# Patient Record
Sex: MALE | Race: WHITE | Employment: OTHER | URBAN - METROPOLITAN AREA
[De-identification: names, ages, dates, MRNs, and addresses within clinical notes are randomized per-mention and may not be internally consistent; named-entity substitution may affect disease eponyms.]

---

## 2018-05-27 PROBLEM — R91.1 PULMONARY NODULE: Status: ACTIVE | Noted: 2018-05-27

## 2018-05-27 PROBLEM — K56.609 SBO (SMALL BOWEL OBSTRUCTION) (HCC): Status: ACTIVE | Noted: 2018-05-27

## 2018-05-27 PROBLEM — E86.0 DEHYDRATION: Status: ACTIVE | Noted: 2018-05-27

## 2018-06-20 ENCOUNTER — HOSPITAL ENCOUNTER (OUTPATIENT)
Dept: OTHER | Age: 58
Discharge: HOME | End: 2018-06-20
Attending: INTERNAL MEDICINE | Admitting: INTERNAL MEDICINE

## 2018-06-20 VITALS
SYSTOLIC BLOOD PRESSURE: 103 MMHG | HEART RATE: 64 BPM | RESPIRATION RATE: 16 BRPM | OXYGEN SATURATION: 95 % | DIASTOLIC BLOOD PRESSURE: 75 MMHG | TEMPERATURE: 97 F

## 2018-06-20 RX ORDER — HYDRALAZINE HYDROCHLORIDE 20 MG/ML
5 INJECTION INTRAMUSCULAR; INTRAVENOUS EVERY 10 MIN PRN
Status: DISCONTINUED | OUTPATIENT
Start: 2018-06-20 | End: 2018-06-21 | Stop reason: HOSPADM

## 2018-06-20 RX ORDER — SODIUM CHLORIDE, SODIUM LACTATE, POTASSIUM CHLORIDE, CALCIUM CHLORIDE 600; 310; 30; 20 MG/100ML; MG/100ML; MG/100ML; MG/100ML
INJECTION, SOLUTION INTRAVENOUS CONTINUOUS
Status: DISCONTINUED | OUTPATIENT
Start: 2018-06-20 | End: 2018-06-21 | Stop reason: HOSPADM

## 2018-06-20 RX ORDER — OXYCODONE HYDROCHLORIDE AND ACETAMINOPHEN 5; 325 MG/1; MG/1
2 TABLET ORAL PRN
Status: ACTIVE | OUTPATIENT
Start: 2018-06-20 | End: 2018-06-20

## 2018-06-20 RX ORDER — OXYCODONE HYDROCHLORIDE AND ACETAMINOPHEN 5; 325 MG/1; MG/1
1 TABLET ORAL PRN
Status: ACTIVE | OUTPATIENT
Start: 2018-06-20 | End: 2018-06-20

## 2018-06-20 RX ORDER — MORPHINE SULFATE 10 MG/ML
2 INJECTION, SOLUTION INTRAMUSCULAR; INTRAVENOUS EVERY 5 MIN PRN
Status: DISCONTINUED | OUTPATIENT
Start: 2018-06-20 | End: 2018-06-21 | Stop reason: HOSPADM

## 2018-06-20 RX ORDER — ONDANSETRON 2 MG/ML
4 INJECTION INTRAMUSCULAR; INTRAVENOUS
Status: ACTIVE | OUTPATIENT
Start: 2018-06-20 | End: 2018-06-20

## 2018-06-20 RX ORDER — MORPHINE SULFATE 10 MG/ML
1 INJECTION, SOLUTION INTRAMUSCULAR; INTRAVENOUS EVERY 5 MIN PRN
Status: DISCONTINUED | OUTPATIENT
Start: 2018-06-20 | End: 2018-06-21 | Stop reason: HOSPADM

## 2018-06-20 RX ORDER — DIPHENHYDRAMINE HYDROCHLORIDE 50 MG/ML
12.5 INJECTION INTRAMUSCULAR; INTRAVENOUS
Status: ACTIVE | OUTPATIENT
Start: 2018-06-20 | End: 2018-06-20

## 2018-06-20 RX ORDER — MEPERIDINE HYDROCHLORIDE 25 MG/ML
12.5 INJECTION INTRAMUSCULAR; INTRAVENOUS; SUBCUTANEOUS EVERY 5 MIN PRN
Status: DISCONTINUED | OUTPATIENT
Start: 2018-06-20 | End: 2018-06-21 | Stop reason: HOSPADM

## 2018-06-20 RX ORDER — PROMETHAZINE HYDROCHLORIDE 25 MG/ML
6.25 INJECTION, SOLUTION INTRAMUSCULAR; INTRAVENOUS
Status: ACTIVE | OUTPATIENT
Start: 2018-06-20 | End: 2018-06-20

## 2018-06-20 RX ORDER — LABETALOL HYDROCHLORIDE 5 MG/ML
5 INJECTION, SOLUTION INTRAVENOUS EVERY 10 MIN PRN
Status: DISCONTINUED | OUTPATIENT
Start: 2018-06-20 | End: 2018-06-21 | Stop reason: HOSPADM

## 2018-06-20 RX ADMIN — SODIUM CHLORIDE, SODIUM LACTATE, POTASSIUM CHLORIDE, CALCIUM CHLORIDE: 600; 310; 30; 20 INJECTION, SOLUTION INTRAVENOUS at 08:43

## 2018-06-20 ASSESSMENT — PAIN DESCRIPTION - ORIENTATION: ORIENTATION: LOWER

## 2018-06-20 ASSESSMENT — PAIN SCALES - GENERAL
PAINLEVEL_OUTOF10: 2
PAINLEVEL_OUTOF10: 1

## 2018-06-20 ASSESSMENT — PAIN - FUNCTIONAL ASSESSMENT: PAIN_FUNCTIONAL_ASSESSMENT: 0-10

## 2018-06-20 ASSESSMENT — PAIN DESCRIPTION - DESCRIPTORS: DESCRIPTORS: CRAMPING

## 2018-06-20 ASSESSMENT — PAIN DESCRIPTION - PAIN TYPE: TYPE: SURGICAL PAIN

## 2018-06-20 ASSESSMENT — PAIN DESCRIPTION - LOCATION: LOCATION: ABDOMEN

## 2018-06-23 ENCOUNTER — HOSPITAL ENCOUNTER (OUTPATIENT)
Dept: CT IMAGING | Age: 58
Discharge: HOME | End: 2018-06-23
Attending: FAMILY MEDICINE | Admitting: FAMILY MEDICINE

## 2018-06-23 DIAGNOSIS — R91.1 LUNG NODULE SEEN ON IMAGING STUDY: ICD-10-CM

## 2018-06-26 PROBLEM — E86.0 DEHYDRATION: Status: RESOLVED | Noted: 2018-05-27 | Resolved: 2018-06-26

## 2018-06-29 PROBLEM — K56.609 SMALL BOWEL OBSTRUCTION (HCC): Status: RESOLVED | Noted: 2018-05-27 | Resolved: 2018-06-29

## 2018-06-29 PROBLEM — K56.609 SBO (SMALL BOWEL OBSTRUCTION) (HCC): Status: ACTIVE | Noted: 2018-06-29

## 2018-07-20 ENCOUNTER — HOSPITAL ENCOUNTER (OUTPATIENT)
Dept: GENERAL RADIOLOGY | Age: 58
Discharge: HOME | End: 2018-07-20
Payer: COMMERCIAL

## 2018-07-20 DIAGNOSIS — R10.9 ABDOMINAL PAIN, UNSPECIFIED ABDOMINAL LOCATION: ICD-10-CM

## 2018-07-20 DIAGNOSIS — K56.609 SBO (SMALL BOWEL OBSTRUCTION) (HCC): ICD-10-CM

## 2018-07-20 DIAGNOSIS — K59.00 CONSTIPATION, UNSPECIFIED CONSTIPATION TYPE: ICD-10-CM

## 2018-07-20 PROCEDURE — 74250 X-RAY XM SM INT 1CNTRST STD: CPT

## 2018-07-31 ENCOUNTER — HOSPITAL ENCOUNTER (OUTPATIENT)
Age: 58
Discharge: HOME | End: 2018-07-31
Payer: COMMERCIAL

## 2018-07-31 ENCOUNTER — HOSPITAL ENCOUNTER (OUTPATIENT)
Dept: GENERAL RADIOLOGY | Age: 58
Discharge: HOME | End: 2018-07-31
Payer: COMMERCIAL

## 2018-07-31 DIAGNOSIS — K56.699 SMALL BOWEL STRICTURE (HCC): ICD-10-CM

## 2018-07-31 PROCEDURE — 74018 RADEX ABDOMEN 1 VIEW: CPT

## 2018-08-01 ENCOUNTER — HOSPITAL ENCOUNTER (OUTPATIENT)
Age: 58
Discharge: HOME | End: 2018-08-01
Payer: COMMERCIAL

## 2018-08-01 ENCOUNTER — HOSPITAL ENCOUNTER (OUTPATIENT)
Dept: GENERAL RADIOLOGY | Age: 58
Discharge: HOME | End: 2018-08-01
Payer: COMMERCIAL

## 2018-08-01 DIAGNOSIS — K56.699 SMALL BOWEL STRICTURE (HCC): ICD-10-CM

## 2018-08-01 PROCEDURE — 74018 RADEX ABDOMEN 1 VIEW: CPT

## 2018-08-03 ENCOUNTER — HOSPITAL ENCOUNTER (OUTPATIENT)
Age: 58
Discharge: HOME | End: 2018-08-03
Payer: COMMERCIAL

## 2018-08-03 ENCOUNTER — HOSPITAL ENCOUNTER (OUTPATIENT)
Dept: GENERAL RADIOLOGY | Age: 58
Discharge: HOME | End: 2018-08-03
Payer: COMMERCIAL

## 2018-08-03 DIAGNOSIS — K56.699 STENOSIS OF INTESTINE (HCC): ICD-10-CM

## 2018-08-03 PROCEDURE — 74018 RADEX ABDOMEN 1 VIEW: CPT

## 2018-08-10 ENCOUNTER — HOSPITAL ENCOUNTER (OUTPATIENT)
Age: 58
Discharge: HOME | End: 2018-08-10
Payer: COMMERCIAL

## 2018-08-15 ENCOUNTER — HOSPITAL ENCOUNTER (OUTPATIENT)
Dept: CT IMAGING | Age: 58
Discharge: HOME | End: 2018-08-15
Payer: COMMERCIAL

## 2018-08-15 DIAGNOSIS — K50.90 CROHN'S DISEASE WITHOUT COMPLICATION, UNSPECIFIED GASTROINTESTINAL TRACT LOCATION (HCC): ICD-10-CM

## 2018-08-15 DIAGNOSIS — K56.699 SMALL BOWEL STRICTURE (HCC): ICD-10-CM

## 2018-08-15 PROCEDURE — 6360000004 HC RX CONTRAST MEDICATION: Performed by: INTERNAL MEDICINE

## 2018-08-15 PROCEDURE — 74177 CT ABD & PELVIS W/CONTRAST: CPT

## 2018-08-15 PROCEDURE — 2500000003 HC RX 250 WO HCPCS: Performed by: INTERNAL MEDICINE

## 2018-08-15 RX ADMIN — BARIUM SULFATE 450 ML: 1 SUSPENSION ORAL at 06:00

## 2018-08-15 RX ADMIN — IOPAMIDOL 75 ML: 755 INJECTION, SOLUTION INTRAVENOUS at 07:20

## 2018-10-11 ENCOUNTER — HOSPITAL ENCOUNTER (OUTPATIENT)
Age: 58
Discharge: HOME | End: 2018-10-11
Payer: COMMERCIAL

## 2018-10-11 LAB
A/G RATIO: 1.6 (ref 1.1–2.2)
ALBUMIN SERPL-MCNC: 4.2 G/DL (ref 3.4–5)
ALP BLD-CCNC: 91 U/L (ref 40–129)
ALT SERPL-CCNC: 12 U/L (ref 10–40)
ANION GAP SERPL CALCULATED.3IONS-SCNC: 11 MMOL/L (ref 3–16)
AST SERPL-CCNC: 14 U/L (ref 15–37)
BASOPHILS ABSOLUTE: 0 K/UL (ref 0–0.2)
BASOPHILS RELATIVE PERCENT: 0.4 %
BILIRUB SERPL-MCNC: 0.6 MG/DL (ref 0–1)
BUN BLDV-MCNC: 16 MG/DL (ref 7–20)
CALCIUM SERPL-MCNC: 9.4 MG/DL (ref 8.3–10.6)
CHLORIDE BLD-SCNC: 102 MMOL/L (ref 99–110)
CO2: 30 MMOL/L (ref 21–32)
CREAT SERPL-MCNC: 1 MG/DL (ref 0.9–1.3)
EOSINOPHILS ABSOLUTE: 0.2 K/UL (ref 0–0.6)
EOSINOPHILS RELATIVE PERCENT: 3.1 %
GFR AFRICAN AMERICAN: >60
GFR NON-AFRICAN AMERICAN: >60
GLOBULIN: 2.7 G/DL
GLUCOSE BLD-MCNC: 97 MG/DL (ref 70–99)
HCT VFR BLD CALC: 44.8 % (ref 40.5–52.5)
HEMOGLOBIN: 14.7 G/DL (ref 13.5–17.5)
LYMPHOCYTES ABSOLUTE: 1.1 K/UL (ref 1–5.1)
LYMPHOCYTES RELATIVE PERCENT: 21 %
MCH RBC QN AUTO: 28.7 PG (ref 26–34)
MCHC RBC AUTO-ENTMCNC: 32.8 G/DL (ref 31–36)
MCV RBC AUTO: 87.6 FL (ref 80–100)
MONOCYTES ABSOLUTE: 0.3 K/UL (ref 0–1.3)
MONOCYTES RELATIVE PERCENT: 5.1 %
NEUTROPHILS ABSOLUTE: 3.7 K/UL (ref 1.7–7.7)
NEUTROPHILS RELATIVE PERCENT: 70.4 %
PDW BLD-RTO: 15 % (ref 12.4–15.4)
PLATELET # BLD: 115 K/UL (ref 135–450)
PMV BLD AUTO: 7.4 FL (ref 5–10.5)
POTASSIUM SERPL-SCNC: 4.5 MMOL/L (ref 3.5–5.1)
RBC # BLD: 5.11 M/UL (ref 4.2–5.9)
SODIUM BLD-SCNC: 143 MMOL/L (ref 136–145)
TOTAL PROTEIN: 6.9 G/DL (ref 6.4–8.2)
WBC # BLD: 5.2 K/UL (ref 4–11)

## 2018-10-11 PROCEDURE — 36415 COLL VENOUS BLD VENIPUNCTURE: CPT

## 2018-10-11 PROCEDURE — 85025 COMPLETE CBC W/AUTO DIFF WBC: CPT

## 2018-10-11 PROCEDURE — 80053 COMPREHEN METABOLIC PANEL: CPT

## 2018-10-29 ENCOUNTER — HOSPITAL ENCOUNTER (OUTPATIENT)
Age: 58
Discharge: HOME | End: 2018-10-29
Payer: COMMERCIAL

## 2018-10-29 ENCOUNTER — HOSPITAL ENCOUNTER (OUTPATIENT)
Dept: GENERAL RADIOLOGY | Age: 58
Discharge: HOME | End: 2018-10-29
Payer: COMMERCIAL

## 2018-10-29 DIAGNOSIS — K59.00 CONSTIPATION, UNSPECIFIED CONSTIPATION TYPE: ICD-10-CM

## 2018-10-29 PROCEDURE — 74019 RADEX ABDOMEN 2 VIEWS: CPT

## 2019-07-12 ENCOUNTER — HOSPITAL ENCOUNTER (OUTPATIENT)
Age: 59
Discharge: HOME | End: 2019-07-12
Payer: COMMERCIAL

## 2019-07-12 ENCOUNTER — HOSPITAL ENCOUNTER (OUTPATIENT)
Dept: MRI IMAGING | Age: 59
Discharge: HOME | End: 2019-07-12
Payer: COMMERCIAL

## 2019-07-12 DIAGNOSIS — G90.2: ICD-10-CM

## 2019-07-12 LAB
CREAT SERPL-MCNC: 0.9 MG/DL (ref 0.8–1.3)
GFR AFRICAN AMERICAN: >60
GFR NON-AFRICAN AMERICAN: >60

## 2019-07-12 PROCEDURE — 70553 MRI BRAIN STEM W/O & W/DYE: CPT

## 2019-07-12 PROCEDURE — A9579 GAD-BASE MR CONTRAST NOS,1ML: HCPCS | Performed by: OPHTHALMOLOGY

## 2019-07-12 PROCEDURE — 6360000004 HC RX CONTRAST MEDICATION: Performed by: OPHTHALMOLOGY

## 2019-07-12 PROCEDURE — 36415 COLL VENOUS BLD VENIPUNCTURE: CPT

## 2019-07-12 PROCEDURE — 82565 ASSAY OF CREATININE: CPT

## 2019-07-12 RX ADMIN — GADOTERIDOL 18 ML: 279.3 INJECTION, SOLUTION INTRAVENOUS at 11:15

## 2021-07-02 ENCOUNTER — APPOINTMENT (OUTPATIENT)
Dept: CT IMAGING | Age: 61
DRG: 386 | End: 2021-07-02

## 2021-07-02 ENCOUNTER — HOSPITAL ENCOUNTER (INPATIENT)
Age: 61
LOS: 2 days | Discharge: HOME | DRG: 386 | End: 2021-07-04
Attending: STUDENT IN AN ORGANIZED HEALTH CARE EDUCATION/TRAINING PROGRAM | Admitting: HOSPITALIST

## 2021-07-02 ENCOUNTER — APPOINTMENT (OUTPATIENT)
Dept: GENERAL RADIOLOGY | Age: 61
DRG: 386 | End: 2021-07-02

## 2021-07-02 DIAGNOSIS — K56.609 SMALL BOWEL OBSTRUCTION (HCC): Primary | ICD-10-CM

## 2021-07-02 LAB
A/G RATIO: 1.7 (ref 1.1–2.2)
ALBUMIN SERPL-MCNC: 4.9 G/DL (ref 3.4–5)
ALP BLD-CCNC: 114 U/L (ref 40–129)
ALT SERPL-CCNC: 17 U/L (ref 10–40)
ANION GAP SERPL CALCULATED.3IONS-SCNC: 19 MMOL/L (ref 3–16)
AST SERPL-CCNC: 19 U/L (ref 15–37)
BASOPHILS ABSOLUTE: 0.1 K/UL (ref 0–0.2)
BASOPHILS RELATIVE PERCENT: 0.7 %
BILIRUB SERPL-MCNC: 0.9 MG/DL (ref 0–1)
BILIRUBIN URINE: NEGATIVE
BLOOD, URINE: NEGATIVE
BUN BLDV-MCNC: 17 MG/DL (ref 7–20)
CALCIUM SERPL-MCNC: 10.5 MG/DL (ref 8.3–10.6)
CHLORIDE BLD-SCNC: 99 MMOL/L (ref 99–110)
CLARITY: CLEAR
CO2: 21 MMOL/L (ref 21–32)
COLOR: YELLOW
CREAT SERPL-MCNC: 1 MG/DL (ref 0.8–1.3)
EOSINOPHILS ABSOLUTE: 0.2 K/UL (ref 0–0.6)
EOSINOPHILS RELATIVE PERCENT: 1.5 %
GFR AFRICAN AMERICAN: >60
GFR NON-AFRICAN AMERICAN: >60
GLOBULIN: 2.9 G/DL
GLUCOSE BLD-MCNC: 132 MG/DL (ref 70–99)
GLUCOSE URINE: NEGATIVE MG/DL
HCT VFR BLD CALC: 50.6 % (ref 40.5–52.5)
HEMOGLOBIN: 16.8 G/DL (ref 13.5–17.5)
KETONES, URINE: 15 MG/DL
LEUKOCYTE ESTERASE, URINE: NEGATIVE
LIPASE: 34 U/L (ref 13–60)
LYMPHOCYTES ABSOLUTE: 2.1 K/UL (ref 1–5.1)
LYMPHOCYTES RELATIVE PERCENT: 14.1 %
MCH RBC QN AUTO: 29.8 PG (ref 26–34)
MCHC RBC AUTO-ENTMCNC: 33.2 G/DL (ref 31–36)
MCV RBC AUTO: 89.8 FL (ref 80–100)
MICROSCOPIC EXAMINATION: ABNORMAL
MONOCYTES ABSOLUTE: 0.7 K/UL (ref 0–1.3)
MONOCYTES RELATIVE PERCENT: 4.6 %
NEUTROPHILS ABSOLUTE: 11.9 K/UL (ref 1.7–7.7)
NEUTROPHILS RELATIVE PERCENT: 79.1 %
NITRITE, URINE: NEGATIVE
PDW BLD-RTO: 15 % (ref 12.4–15.4)
PH UA: 8 (ref 5–8)
PLATELET # BLD: 195 K/UL (ref 135–450)
PMV BLD AUTO: 7 FL (ref 5–10.5)
POTASSIUM REFLEX MAGNESIUM: 3.7 MMOL/L (ref 3.5–5.1)
PROCALCITONIN: 0.06 NG/ML (ref 0–0.15)
PROTEIN UA: NEGATIVE MG/DL
RBC # BLD: 5.64 M/UL (ref 4.2–5.9)
SARS-COV-2, NAAT: NOT DETECTED
SODIUM BLD-SCNC: 139 MMOL/L (ref 136–145)
SPECIFIC GRAVITY UA: 1.01 (ref 1–1.03)
TOTAL PROTEIN: 7.8 G/DL (ref 6.4–8.2)
URINE REFLEX TO CULTURE: ABNORMAL
URINE TYPE: ABNORMAL
UROBILINOGEN, URINE: 0.2 E.U./DL
WBC # BLD: 15 K/UL (ref 4–11)

## 2021-07-02 PROCEDURE — 74018 RADEX ABDOMEN 1 VIEW: CPT

## 2021-07-02 PROCEDURE — 6360000002 HC RX W HCPCS

## 2021-07-02 PROCEDURE — 81003 URINALYSIS AUTO W/O SCOPE: CPT

## 2021-07-02 PROCEDURE — 84145 PROCALCITONIN (PCT): CPT

## 2021-07-02 PROCEDURE — 1200000000 HC SEMI PRIVATE

## 2021-07-02 PROCEDURE — 85025 COMPLETE CBC W/AUTO DIFF WBC: CPT

## 2021-07-02 PROCEDURE — 99285 EMERGENCY DEPT VISIT HI MDM: CPT

## 2021-07-02 PROCEDURE — 6360000002 HC RX W HCPCS: Performed by: STUDENT IN AN ORGANIZED HEALTH CARE EDUCATION/TRAINING PROGRAM

## 2021-07-02 PROCEDURE — 6360000004 HC RX CONTRAST MEDICATION: Performed by: STUDENT IN AN ORGANIZED HEALTH CARE EDUCATION/TRAINING PROGRAM

## 2021-07-02 PROCEDURE — 96374 THER/PROPH/DIAG INJ IV PUSH: CPT

## 2021-07-02 PROCEDURE — 74177 CT ABD & PELVIS W/CONTRAST: CPT

## 2021-07-02 PROCEDURE — 96375 TX/PRO/DX INJ NEW DRUG ADDON: CPT

## 2021-07-02 PROCEDURE — 83690 ASSAY OF LIPASE: CPT

## 2021-07-02 PROCEDURE — 80053 COMPREHEN METABOLIC PANEL: CPT

## 2021-07-02 PROCEDURE — 87635 SARS-COV-2 COVID-19 AMP PRB: CPT

## 2021-07-02 RX ORDER — MORPHINE SULFATE 4 MG/ML
4 INJECTION, SOLUTION INTRAMUSCULAR; INTRAVENOUS ONCE
Status: COMPLETED | OUTPATIENT
Start: 2021-07-02 | End: 2021-07-02

## 2021-07-02 RX ORDER — SODIUM CHLORIDE 0.9 % (FLUSH) 0.9 %
5-40 SYRINGE (ML) INJECTION EVERY 12 HOURS SCHEDULED
Status: DISCONTINUED | OUTPATIENT
Start: 2021-07-03 | End: 2021-07-04 | Stop reason: HOSPADM

## 2021-07-02 RX ORDER — SODIUM CHLORIDE 9 MG/ML
INJECTION, SOLUTION INTRAVENOUS CONTINUOUS
Status: DISCONTINUED | OUTPATIENT
Start: 2021-07-03 | End: 2021-07-04 | Stop reason: HOSPADM

## 2021-07-02 RX ORDER — ACETAMINOPHEN 650 MG/1
650 SUPPOSITORY RECTAL EVERY 6 HOURS PRN
Status: DISCONTINUED | OUTPATIENT
Start: 2021-07-02 | End: 2021-07-04 | Stop reason: HOSPADM

## 2021-07-02 RX ORDER — ONDANSETRON 2 MG/ML
INJECTION INTRAMUSCULAR; INTRAVENOUS
Status: COMPLETED
Start: 2021-07-02 | End: 2021-07-02

## 2021-07-02 RX ORDER — SODIUM CHLORIDE 9 MG/ML
25 INJECTION, SOLUTION INTRAVENOUS PRN
Status: DISCONTINUED | OUTPATIENT
Start: 2021-07-02 | End: 2021-07-04 | Stop reason: HOSPADM

## 2021-07-02 RX ORDER — POLYETHYLENE GLYCOL 3350 17 G/17G
17 POWDER, FOR SOLUTION ORAL DAILY PRN
Status: DISCONTINUED | OUTPATIENT
Start: 2021-07-02 | End: 2021-07-04 | Stop reason: HOSPADM

## 2021-07-02 RX ORDER — ONDANSETRON 4 MG/1
4 TABLET, ORALLY DISINTEGRATING ORAL EVERY 8 HOURS PRN
Status: DISCONTINUED | OUTPATIENT
Start: 2021-07-02 | End: 2021-07-04 | Stop reason: HOSPADM

## 2021-07-02 RX ORDER — SODIUM CHLORIDE 0.9 % (FLUSH) 0.9 %
5-40 SYRINGE (ML) INJECTION PRN
Status: DISCONTINUED | OUTPATIENT
Start: 2021-07-02 | End: 2021-07-04 | Stop reason: HOSPADM

## 2021-07-02 RX ORDER — ONDANSETRON 2 MG/ML
4 INJECTION INTRAMUSCULAR; INTRAVENOUS EVERY 6 HOURS PRN
Status: DISCONTINUED | OUTPATIENT
Start: 2021-07-02 | End: 2021-07-04 | Stop reason: HOSPADM

## 2021-07-02 RX ORDER — ACETAMINOPHEN 325 MG/1
650 TABLET ORAL EVERY 6 HOURS PRN
Status: DISCONTINUED | OUTPATIENT
Start: 2021-07-02 | End: 2021-07-04 | Stop reason: HOSPADM

## 2021-07-02 RX ORDER — ONDANSETRON 2 MG/ML
4 INJECTION INTRAMUSCULAR; INTRAVENOUS ONCE
Status: COMPLETED | OUTPATIENT
Start: 2021-07-02 | End: 2021-07-02

## 2021-07-02 RX ADMIN — ONDANSETRON HYDROCHLORIDE 4 MG: 2 INJECTION, SOLUTION INTRAMUSCULAR; INTRAVENOUS at 23:32

## 2021-07-02 RX ADMIN — MORPHINE SULFATE 4 MG: 4 INJECTION INTRAVENOUS at 19:52

## 2021-07-02 RX ADMIN — ONDANSETRON HYDROCHLORIDE 4 MG: 2 INJECTION, SOLUTION INTRAMUSCULAR; INTRAVENOUS at 19:52

## 2021-07-02 RX ADMIN — IOPAMIDOL 75 ML: 755 INJECTION, SOLUTION INTRAVENOUS at 20:24

## 2021-07-02 RX ADMIN — HYDROMORPHONE HYDROCHLORIDE 0.5 MG: 1 INJECTION, SOLUTION INTRAMUSCULAR; INTRAVENOUS; SUBCUTANEOUS at 21:02

## 2021-07-02 ASSESSMENT — PAIN DESCRIPTION - LOCATION
LOCATION: ABDOMEN

## 2021-07-02 ASSESSMENT — PAIN SCALES - GENERAL
PAINLEVEL_OUTOF10: 9
PAINLEVEL_OUTOF10: 7
PAINLEVEL_OUTOF10: 10
PAINLEVEL_OUTOF10: 10
PAINLEVEL_OUTOF10: 9

## 2021-07-02 ASSESSMENT — PAIN DESCRIPTION - DESCRIPTORS: DESCRIPTORS: ACHING

## 2021-07-02 ASSESSMENT — PAIN DESCRIPTION - PAIN TYPE: TYPE: ACUTE PAIN

## 2021-07-02 ASSESSMENT — PAIN DESCRIPTION - FREQUENCY: FREQUENCY: CONTINUOUS

## 2021-07-02 NOTE — ED PROVIDER NOTES
[address] ED      CHIEF COMPLAINT  Abdominal Pain (pt comes in with abd pain that started about an hour ago. Pt reports hx of crohnes and says he feels sure he has a bowel obstruction. He has had 3 obstructions in past. Pt sweating and vomiting upon arrival )     HISTORY OF PRESENT ILLNESS  Maria Dolores Benavidez is a 57 y.o. male  who presents to the ED complaining of upper abdominal pain that started approximately an hour ago. Patient states he has a history of multiple small bowel obstructions, also history of Crohn's disease. He states it feels similar to when he has had bowel obstructions in the past.  He states that he has had some nausea and vomiting as well as chills associated with this. Emesis has been nonbloody. He states that typically when he gets a bowel obstruction the pain is in his lower abdomen, this time is in his upper abdomen but otherwise feels the same as previous bowel obstructions. He is actively retching in the department. No other complaints, modifying factors or associated symptoms. I have reviewed the following from the nursing documentation.     Past Medical History:   Diagnosis Date    Arthritis     Back    Depression     Iritis     Nasal polyp     PTSD (post-traumatic stress disorder)      Past Surgical History:   Procedure Laterality Date    APPENDECTOMY      CARPAL TUNNEL RELEASE      bilateral     COLONOSCOPY  06/20/2018    TI inflamation     Family History   Problem Relation Age of Onset    Heart Failure Father     Hypertension Father     Asthma Neg Hx     Cancer Neg Hx     Diabetes Neg Hx     Emphysema Neg Hx      Social History     Socioeconomic History    Marital status:      Spouse name: Not on file    Number of children: Not on file    Years of education: Not on file    Highest education level: Not on file   Occupational History    Occupation: deputy seo     Comment: French   Tobacco Use    Smoking status: Never Smoker    Smokeless tobacco: Never Used   Substance and Sexual Activity    Alcohol use: Yes     Comment: once a week    Drug use: Yes     Frequency: 7.0 times per week     Types: Marijuana     Comment: every day    Sexual activity: Not on file   Other Topics Concern    Not on file   Social History Narrative    Not on file     Social Determinants of Health     Financial Resource Strain:     Difficulty of Paying Living Expenses:    Food Insecurity:     Worried About Running Out of Food in the Last Year:     Ran Out of Food in the Last Year:    Transportation Needs:     Lack of Transportation (Medical):  Lack of Transportation (Non-Medical):    Physical Activity:     Days of Exercise per Week:     Minutes of Exercise per Session:    Stress:     Feeling of Stress :    Social Connections:     Frequency of Communication with Friends and Family:     Frequency of Social Gatherings with Friends and Family:     Attends Cheondoism Services:     Active Member of Clubs or Organizations:     Attends Club or Organization Meetings:     Marital Status:    Intimate Partner Violence:     Fear of Current or Ex-Partner:     Emotionally Abused:     Physically Abused:     Sexually Abused:      No current facility-administered medications for this encounter. Current Outpatient Medications   Medication Sig Dispense Refill    sertraline (ZOLOFT) 100 MG tablet Take 200 mg by mouth daily      buPROPion (WELLBUTRIN XL) 300 MG extended release tablet Take 300 mg by mouth every morning       No Known Allergies    REVIEW OF SYSTEMS  10 systems reviewed, pertinent positives per HPI otherwise noted to be negative. PHYSICAL EXAM  BP (!) 111/98   Pulse 61   Resp 18   Ht 6' 1\" (1.854 m)   Wt 190 lb (86.2 kg)   SpO2 100%   BMI 25.07 kg/m²    GENERAL APPEARANCE: Awake and alert. Cooperative. Moderate distress. HENT: Normocephalic. Atraumatic. NECK: Supple. EYES: PERRL. EOM's grossly intact. HEART/CHEST: RRR. No murmurs. LUNGS: Respirations unlabored. CTAB. Good air exchange. Speaking comfortably in full sentences. ABDOMEN: There is palpation diffusely, most prominently in the epigastrium and suprapubically. No guarding rigidity. Nonperitoneal.  MUSCULOSKELETAL: No extremity edema. Compartments soft. No deformity. No tenderness in the extremities. All extremities neurovascularly intact. SKIN: Warm and dry. No acute rashes. NEUROLOGICAL: Alert and oriented. CN's 2-12 intact. No gross facial drooping. Strength 5/5, sensation intact. PSYCHIATRIC: Normal mood and affect. LABS  I have reviewed all labs for this visit.    Results for orders placed or performed during the hospital encounter of 07/02/21   CBC Auto Differential   Result Value Ref Range    WBC 15.0 (H) 4.0 - 11.0 K/uL    RBC 5.64 4.20 - 5.90 M/uL    Hemoglobin 16.8 13.5 - 17.5 g/dL    Hematocrit 50.6 40.5 - 52.5 %    MCV 89.8 80.0 - 100.0 fL    MCH 29.8 26.0 - 34.0 pg    MCHC 33.2 31.0 - 36.0 g/dL    RDW 15.0 12.4 - 15.4 %    Platelets 806 061 - 427 K/uL    MPV 7.0 5.0 - 10.5 fL    Neutrophils % 79.1 %    Lymphocytes % 14.1 %    Monocytes % 4.6 %    Eosinophils % 1.5 %    Basophils % 0.7 %    Neutrophils Absolute 11.9 (H) 1.7 - 7.7 K/uL    Lymphocytes Absolute 2.1 1.0 - 5.1 K/uL    Monocytes Absolute 0.7 0.0 - 1.3 K/uL    Eosinophils Absolute 0.2 0.0 - 0.6 K/uL    Basophils Absolute 0.1 0.0 - 0.2 K/uL   Comprehensive Metabolic Panel w/ Reflex to MG   Result Value Ref Range    Sodium 139 136 - 145 mmol/L    Potassium reflex Magnesium 3.7 3.5 - 5.1 mmol/L    Chloride 99 99 - 110 mmol/L    CO2 21 21 - 32 mmol/L    Anion Gap 19 (H) 3 - 16    Glucose 132 (H) 70 - 99 mg/dL    BUN 17 7 - 20 mg/dL    CREATININE 1.0 0.8 - 1.3 mg/dL    GFR Non-African American >60 >60    GFR African American >60 >60    Calcium 10.5 8.3 - 10.6 mg/dL    Total Protein 7.8 6.4 - 8.2 g/dL    Albumin 4.9 3.4 - 5.0 g/dL    Albumin/Globulin Ratio 1.7 1.1 - 2.2    Total Bilirubin 0.9 0.0 - 1.0 mg/dL    Alkaline Phosphatase 114 40 - 129 U/L    ALT 17 10 - 40 U/L    AST 19 15 - 37 U/L    Globulin 2.9 g/dL   Lipase   Result Value Ref Range    Lipase 34.0 13.0 - 60.0 U/L     RADIOLOGY  CT ABDOMEN PELVIS W IV CONTRAST Additional Contrast? None   Final Result   1. Small-bowel obstruction with transition point in the proximal to mid   ileum. New fat attenuation focus measuring 2.1 cm by 0.7 cm in the lumen of   the ileum at the transition point could represent ingested material causing   obstruction due to underlying narrowing of ileum. 2. Other findings as described. ED COURSE/MDM  Patient seen and evaluated. Old records reviewed. Labs and imaging reviewed and results discussed with patient. Patient is a 57-year-old male, with history of Crohn's disease, also with recurrent small bowel obstruction presenting with concerns for abdominal pain and vomiting. HPI as detailed above. Upon arrival in the ED, vitals reassuring. Patient is in moderate distress. He is actively retching here in the department. Treated with Zofran and morphine, continued to have ongoing pain and was additionally treated with Dilaudid. Labs were performed, he does have a leukocytosis of 15 which is likely reactive from his vomiting. LFTs and lipase are within normal limits. No acute concerning electrolyte abnormalities. CT shows a small bowel obstruction with transition point in the proximal to mid ileum. NG tube was ordered. findings were discussed with Dr. Domingo Babb in general surgery who recommended medical management at this time admission to hospitalist service. Patient will be hospitalized for further work-up and treatment of his condition.     During the patient's ED course, the patient was given:  Medications   ondansetron (ZOFRAN) injection 4 mg (4 mg Intravenous Given 7/2/21 1952)   morphine sulfate (PF) injection 4 mg (4 mg Intravenous Given 7/2/21 1952)   iopamidol (ISOVUE-370) 76 % injection 75 mL (75 mLs Intravenous Given 7/2/21 2024)   HYDROmorphone (DILAUDID) injection 0.5 mg (0.5 mg Intravenous Given 7/2/21 2102)        CLINICAL IMPRESSION  1. Small bowel obstruction (HCC)        Blood pressure (!) 111/98, pulse 61, resp. rate 18, height 6' 1\" (1.854 m), weight 190 lb (86.2 kg), SpO2 100 %. DISPOSITION  Silvestre Reynolds was admitted in stable condition. Patient was given scripts for the following medications. I counseled patient how to take these medications. New Prescriptions    No medications on file       Follow-up with:  No follow-up provider specified. DISCLAIMER: This chart was created using Dragon dictation software. Efforts were made by me to ensure accuracy, however some errors may be present due to limitations of this technology and occasionally words are not transcribed correctly.        Jorge Madrigal MD  07/02/21 7632

## 2021-07-03 ENCOUNTER — APPOINTMENT (OUTPATIENT)
Dept: GENERAL RADIOLOGY | Age: 61
DRG: 386 | End: 2021-07-03

## 2021-07-03 PROBLEM — D72.829 LEUCOCYTOSIS: Status: ACTIVE | Noted: 2021-07-03

## 2021-07-03 PROBLEM — E87.20 LACTIC ACIDOSIS: Status: ACTIVE | Noted: 2021-07-03

## 2021-07-03 LAB
ALBUMIN SERPL-MCNC: 4.2 G/DL (ref 3.4–5)
ALP BLD-CCNC: 104 U/L (ref 40–129)
ALT SERPL-CCNC: 14 U/L (ref 10–40)
ANION GAP SERPL CALCULATED.3IONS-SCNC: 14 MMOL/L (ref 3–16)
AST SERPL-CCNC: 18 U/L (ref 15–37)
BASOPHILS ABSOLUTE: 0 K/UL (ref 0–0.2)
BASOPHILS RELATIVE PERCENT: 0.3 %
BILIRUB SERPL-MCNC: 0.9 MG/DL (ref 0–1)
BILIRUBIN DIRECT: <0.2 MG/DL (ref 0–0.3)
BILIRUBIN, INDIRECT: NORMAL MG/DL (ref 0–1)
BUN BLDV-MCNC: 17 MG/DL (ref 7–20)
CALCIUM SERPL-MCNC: 9.1 MG/DL (ref 8.3–10.6)
CHLORIDE BLD-SCNC: 103 MMOL/L (ref 99–110)
CO2: 24 MMOL/L (ref 21–32)
CREAT SERPL-MCNC: 0.7 MG/DL (ref 0.8–1.3)
EOSINOPHILS ABSOLUTE: 0 K/UL (ref 0–0.6)
EOSINOPHILS RELATIVE PERCENT: 0 %
GFR AFRICAN AMERICAN: >60
GFR NON-AFRICAN AMERICAN: >60
GLUCOSE BLD-MCNC: 160 MG/DL (ref 70–99)
GLUCOSE BLD-MCNC: 190 MG/DL (ref 70–99)
GLUCOSE BLD-MCNC: 192 MG/DL (ref 70–99)
GLUCOSE BLD-MCNC: 204 MG/DL (ref 70–99)
HCT VFR BLD CALC: 44.2 % (ref 40.5–52.5)
HEMOGLOBIN: 14.7 G/DL (ref 13.5–17.5)
LACTIC ACID: 1 MMOL/L (ref 0.4–2)
LACTIC ACID: 3.4 MMOL/L (ref 0.4–2)
LACTIC ACID: 3.5 MMOL/L (ref 0.4–2)
LYMPHOCYTES ABSOLUTE: 0.6 K/UL (ref 1–5.1)
LYMPHOCYTES RELATIVE PERCENT: 5.2 %
MCH RBC QN AUTO: 29.6 PG (ref 26–34)
MCHC RBC AUTO-ENTMCNC: 33.2 G/DL (ref 31–36)
MCV RBC AUTO: 89.1 FL (ref 80–100)
MONOCYTES ABSOLUTE: 0.4 K/UL (ref 0–1.3)
MONOCYTES RELATIVE PERCENT: 3.1 %
NEUTROPHILS ABSOLUTE: 11 K/UL (ref 1.7–7.7)
NEUTROPHILS RELATIVE PERCENT: 91.4 %
PDW BLD-RTO: 14.9 % (ref 12.4–15.4)
PERFORMED ON: ABNORMAL
PLATELET # BLD: 130 K/UL (ref 135–450)
PMV BLD AUTO: 7.3 FL (ref 5–10.5)
POTASSIUM REFLEX MAGNESIUM: 3.8 MMOL/L (ref 3.5–5.1)
RBC # BLD: 4.96 M/UL (ref 4.2–5.9)
SODIUM BLD-SCNC: 141 MMOL/L (ref 136–145)
TOTAL PROTEIN: 6.8 G/DL (ref 6.4–8.2)
WBC # BLD: 12.1 K/UL (ref 4–11)

## 2021-07-03 PROCEDURE — 36415 COLL VENOUS BLD VENIPUNCTURE: CPT

## 2021-07-03 PROCEDURE — 85025 COMPLETE CBC W/AUTO DIFF WBC: CPT

## 2021-07-03 PROCEDURE — 6360000002 HC RX W HCPCS: Performed by: HOSPITALIST

## 2021-07-03 PROCEDURE — 2580000003 HC RX 258: Performed by: HOSPITALIST

## 2021-07-03 PROCEDURE — 99232 SBSQ HOSP IP/OBS MODERATE 35: CPT | Performed by: INTERNAL MEDICINE

## 2021-07-03 PROCEDURE — 80076 HEPATIC FUNCTION PANEL: CPT

## 2021-07-03 PROCEDURE — 1200000000 HC SEMI PRIVATE

## 2021-07-03 PROCEDURE — 99253 IP/OBS CNSLTJ NEW/EST LOW 45: CPT | Performed by: SURGERY

## 2021-07-03 PROCEDURE — 80048 BASIC METABOLIC PNL TOTAL CA: CPT

## 2021-07-03 PROCEDURE — 74019 RADEX ABDOMEN 2 VIEWS: CPT

## 2021-07-03 PROCEDURE — 83605 ASSAY OF LACTIC ACID: CPT

## 2021-07-03 RX ORDER — PROMETHAZINE HYDROCHLORIDE 25 MG/ML
6.25 INJECTION, SOLUTION INTRAMUSCULAR; INTRAVENOUS EVERY 6 HOURS PRN
Status: DISCONTINUED | OUTPATIENT
Start: 2021-07-03 | End: 2021-07-04 | Stop reason: HOSPADM

## 2021-07-03 RX ORDER — METHYLPREDNISOLONE SODIUM SUCCINATE 125 MG/2ML
60 INJECTION, POWDER, LYOPHILIZED, FOR SOLUTION INTRAMUSCULAR; INTRAVENOUS DAILY
Status: DISCONTINUED | OUTPATIENT
Start: 2021-07-03 | End: 2021-07-04

## 2021-07-03 RX ADMIN — Medication 5 ML: at 01:22

## 2021-07-03 RX ADMIN — HYDROMORPHONE HYDROCHLORIDE 0.5 MG: 1 INJECTION, SOLUTION INTRAMUSCULAR; INTRAVENOUS; SUBCUTANEOUS at 01:22

## 2021-07-03 RX ADMIN — PROMETHAZINE HYDROCHLORIDE 6.25 MG: 25 INJECTION INTRAMUSCULAR; INTRAVENOUS at 09:57

## 2021-07-03 RX ADMIN — ONDANSETRON HYDROCHLORIDE 4 MG: 2 INJECTION, SOLUTION INTRAMUSCULAR; INTRAVENOUS at 09:20

## 2021-07-03 RX ADMIN — SODIUM CHLORIDE: 9 INJECTION, SOLUTION INTRAVENOUS at 14:44

## 2021-07-03 RX ADMIN — METHYLPREDNISOLONE SODIUM SUCCINATE 60 MG: 125 INJECTION, POWDER, FOR SOLUTION INTRAMUSCULAR; INTRAVENOUS at 04:24

## 2021-07-03 RX ADMIN — SODIUM CHLORIDE: 9 INJECTION, SOLUTION INTRAVENOUS at 00:06

## 2021-07-03 ASSESSMENT — PAIN SCALES - GENERAL: PAINLEVEL_OUTOF10: 6

## 2021-07-03 NOTE — PROGRESS NOTES
When pt came back from xray, he started with severe nausea after the NG tube was hooked back up to suction. Tube disconnected from suction per pts request.  Pt did have small amount of emesis. Emesis is greenish brown in color. No blood. Pt denies further needs at this time.

## 2021-07-03 NOTE — PROGRESS NOTES
Progress Note    Admit Date:  7/2/2021    57year old male with Crohn's disease, PTSD, Depression, arthritis, and h/o SBO presented to [facility] with c/o abdominal cramping, nausea, and vomiting. Admitted for SBO and Crohn's flare. Subjective:  Mr. Barrett Vance seen. [place] nauseous this morning when NG was connected to suction. Having BM's. Feels improved otherwise. Objective:   BP (!) 121/52   Pulse 57   Temp 97 °F (36.1 °C) (Oral)   Resp 16   Ht 6' 1\" (1.854 m)   Wt 195 lb 11.2 oz (88.8 kg)   SpO2 100%   BMI 25.82 kg/m²          Intake/Output Summary (Last 24 hours) at 7/3/2021 1014  Last data filed at 7/3/2021 0740  Gross per 24 hour   Intake 563.05 ml   Output 650 ml   Net -86.95 ml       Physical Exam:    General appearance: alert, appears stated age and cooperative  Head: Normocephalic, without obvious abnormality, atraumatic  Eyes: conjunctivae/corneas clear. PERRL, EOM's intact.   Neck: no adenopathy, no carotid bruit, no JVD, supple, symmetrical  Lungs: clear to auscultation bilaterally  Heart: regular rate and rhythm, S1, S2 normal, no murmur, click, rub or gallop  Abdomen: soft, non-tender; bowel sounds hypoactive; no masses,  no organomegaly  Extremities: extremities normal, atraumatic, no cyanosis or edema  Pulses: 2+ and symmetric  Skin: Skin color, texture, turgor normal. No rashes or lesions  Neurologic: Grossly normal    Scheduled Meds:   methylPREDNISolone  60 mg Intravenous Daily    sodium chloride flush  5-40 mL Intravenous 2 times per day    enoxaparin  40 mg Subcutaneous Daily       Continuous Infusions:   sodium chloride      sodium chloride 75 mL/hr at 07/03/21 0740       PRN Meds:  promethazine, sodium chloride flush, sodium chloride, ondansetron **OR** ondansetron, polyethylene glycol, acetaminophen **OR** acetaminophen, HYDROmorphone      Data:  CBC:   Recent Labs     07/02/21  1940 07/03/21  0441   WBC 15.0* 12.1*   HGB 16.8 14.7   HCT 50.6 44.2   MCV 89.8 89.1    130* BMP:   Recent Labs     07/02/21 1940 07/03/21  0441    141   K 3.7 3.8   CL 99 103   CO2 21 24   BUN 17 17   CREATININE 1.0 0.7*     LIVER PROFILE:   Recent Labs     07/02/21 1940 07/03/21  0441   AST 19 18   ALT 17 14   LIPASE 34.0  --    BILIDIR  --  <0.2   BILITOT 0.9 0.9   ALKPHOS 114 104     PT/INR: No results for input(s): PROTIME, INR in the last 72 hours. Cultures:   COVID: not detected    Radiology  XR ABDOMEN (2 VIEWS)   Final Result   Nasogastric tube projects in normal position. Nonspecific bowel gas pattern. XR ABDOMEN FOR NG/OG/NE TUBE PLACEMENT   Final Result   Enteric tube tip projects at the gastric body with side hole projecting just   distal to the GE junction. Consider advancement by at least 3 cm. CT ABDOMEN PELVIS W IV CONTRAST Additional Contrast? None   Final Result   1. Small-bowel obstruction with transition point in the proximal to mid   ileum. New fat attenuation focus measuring 2.1 cm by 0.7 cm in the lumen of   the ileum at the transition point could represent ingested material causing   obstruction due to underlying narrowing of ileum. 2. Other findings as described. Anthony Fischer MD have reviewed the chart on Travis Serna and personally interviewed and examined patient, reviewed the data (labs and imaging) and after discussion with my PA formulated the plan. Agree with note with the following edits. HPI:     Patient admitted for abdominal pain. Work up showed SBO. He has had a BM and is feeling better. He did have an episode of emesis. He says the NG tube is bothering him. Xray from this am shows that the SBO is resolved. I reviewed the patient's Past Medical History, Past Surgical History, Medications, and Allergies.      Physical exam:    BP (!) 121/52   Pulse 57   Temp 97 °F (36.1 °C) (Oral)   Resp 16   Ht 6' 1\" (1.854 m)   Wt 195 lb 11.2 oz (88.8 kg)   SpO2 100%   BMI 25.82 kg/m²     Gen: No distress. Alert. Eyes: PERRL. No sclera icterus. No conjunctival injection. ENT: No discharge. Pharynx clear. Neck: Trachea midline. Normal thyroid. Resp: No accessory muscle use. No crackles. No wheezes. No rhonchi. No dullness on percussion. CV: Regular rate. Regular rhythm. No murmur or rub. No edema. GI: Non-tender. Non-distended. No masses. No organomegaly. Normal bowel sounds. No hernia. Assessment:  Active Problems:    SBO (small bowel obstruction) (HCC)  Resolved Problems:    * No resolved hospital problems. *      Plan:  SBO  - admitted to med-surg  - Gen surg consult  - NPO, IVF's  - pain control: Dilaudid prn  - NG tube to LWS  - resolved. Await gen surgery recommendations    Crohn's disease  - IV Solu-medrol. Used to Follow with Dr. Taylor Langley. Leukocytosis   Lactic acidosis   - due to above. Trend WBC, Lactic.   - likely reactive. Bradycardia  - monitor. Depression, PTSD  - resume home regimen when able.     DVT Prophylaxis: lovenox  Diet: clear   Dispo - inpatient    Samir Furnish FNP-C  7/3/2021      Moises Moser MD, MD 7/3/2021 10:14 AM

## 2021-07-03 NOTE — PROGRESS NOTES
Patient admitted to room 236 from ER. Patient oriented to room, call light, bed rails, phone, lights and bathroom. Patient instructed about the schedule of the day including: vital sign frequency, lab draws, possible tests, frequency of MD and staff rounds, I &O's, and prescribed diet. Bed alarm deferred d/t low fall risk. Bed locked, in lowest position, side rails up 2/4, call light within reach. Recliner Assessment:     Patient is able to demonstrate the ability to move from a reclining position to an upright position within the recliner. Bedside Mobility Assessment Tool (BMAT):     Assessment Level 1- Sit and Shake    1. From a semi-reclined position, ask patient to sit up and rotate to a seated position at the side of the bed. Can use the bedrail. 2. Ask patient to reach out and grab your hand and shake making sure patient reaches across his/her midline. Pass- Patient is able to come to a seated position, maintain core strength. Maintains seated balance while reaching across midline. Move on to Assessment Level 2. Assessment Level 2- Stretch and Point   1. With patient in seated position at the side of the bed, have patient place both feet on the floor (or stool) with knees no higher than hips. 2. Ask patient to stretch one leg and straighten the knee, then bend the ankle/flex and point the toes. If appropriate, repeat with the other leg. Pass- Patient is able to demonstrate appropriate quad strength on intended weight bearing limb(s). Move onto Assessment Level 3. Assessment Level 3- Stand   1. Ask patient to elevate off the bed or chair (seated to standing) using an assistive device (cane, bedrail). 2. Patient should be able to raise buttocks off be and hold for a count of five. May repeat once. Pass- Patient maintains standing stability for at least 5 seconds, proceed to assessment level 4. Assessment Level 4- Walk   1. Ask patient to march in place at bedside.     2. Then ask patient to advance step and return each foot. Some medical conditions may render a patient from stepping backwards, use your best clinical judgement. Pass- Patient demonstrates balance while shifting weight and ability to step, takes independent steps, does not use assistive device patient is MOBILITY LEVEL 4.       Mobility Level- 4

## 2021-07-03 NOTE — ED NOTES
Pt report given to Lucy Summers RN. Pt taken to floor by same. Aylin Mckeon medicated pt with zofran before he went to floor.       Valdemar Stanley RN  07/02/21 4400

## 2021-07-03 NOTE — ED NOTES
2141- Perfect serve sent to Dr. Alma Delia Campbell   2201- Dr. Alma Delia Campbell returned call and spoke with Deo Cline  07/02/21 2142       Aron Glynn  07/02/21 2201

## 2021-07-03 NOTE — PLAN OF CARE
Problem: Infection:  Goal: Will remain free from infection  Description: Will remain free from infection  Outcome: Ongoing     Problem: Safety:  Goal: Free from accidental physical injury  Description: Free from accidental physical injury  Outcome: Ongoing  Goal: Free from intentional harm  Description: Free from intentional harm  Outcome: Ongoing     Problem: Daily Care:  Goal: Daily care needs are met  Description: Daily care needs are met  Outcome: Ongoing     Problem: Pain:  Goal: Patient's pain/discomfort is manageable  Description: Patient's pain/discomfort is manageable  Outcome: Ongoing  Goal: Pain level will decrease  Description: Pain level will decrease  Outcome: Ongoing  Goal: Control of acute pain  Description: Control of acute pain  Outcome: Ongoing     Problem: Skin Integrity:  Goal: Skin integrity will stabilize  Description: Skin integrity will stabilize  Outcome: Ongoing     Problem: Discharge Planning:  Goal: Patients continuum of care needs are met  Description: Patients continuum of care needs are met  Outcome: Ongoing     Problem: Activity:  Goal: Risk for activity intolerance will decrease  Description: Risk for activity intolerance will decrease  Outcome: Ongoing     Problem:  Bowel/Gastric:  Goal: Bowel function will improve  Description: Bowel function will improve  Outcome: Ongoing  Goal: Diagnostic test results will improve  Description: Diagnostic test results will improve  Outcome: Ongoing  Goal: Occurrences of nausea will decrease  Description: Occurrences of nausea will decrease  Outcome: Ongoing  Goal: Occurrences of vomiting will decrease  Description: Occurrences of vomiting will decrease  Outcome: Ongoing     Problem: Fluid Volume:  Goal: Maintenance of adequate hydration will improve  Description: Maintenance of adequate hydration will improve  Outcome: Ongoing     Problem: Health Behavior:  Goal: Ability to state signs and symptoms to report to health care provider will improve  Description: Ability to state signs and symptoms to report to health care provider will improve  Outcome: Ongoing     Problem: Physical Regulation:  Goal: Complications related to the disease process, condition or treatment will be avoided or minimized  Description: Complications related to the disease process, condition or treatment will be avoided or minimized  Outcome: Ongoing  Goal: Ability to maintain clinical measurements within normal limits will improve  Description: Ability to maintain clinical measurements within normal limits will improve  Outcome: Ongoing     Problem: Sensory:  Goal: Pain level will decrease  Description: Pain level will decrease  Outcome: Ongoing  Goal: Ability to identify factors that increase the pain will improve  Description: Ability to identify factors that increase the pain will improve  Outcome: Ongoing  Goal: Ability to notify healthcare provider of pain before it becomes unmanageable or unbearable will improve  Description: Ability to notify healthcare provider of pain before it becomes unmanageable or unbearable will improve  Outcome: Ongoing

## 2021-07-03 NOTE — CONSULTS
Department of General Surgery Consult    PATIENT NAME: Josefa Zimmer   YOB: 1960    ADMISSION DATE: 7/2/2021  7:19 PM      TODAY'S DATE: 7/3/2021    Reason for Consult:  SBO    Chief Complaint: Abdominal pain    Requesting Physician:  Kathrine Steve    HISTORY OF PRESENT ILLNESS:              The patient is a 57 y.o. male who presents with abdominal pain. He has a history of Crohn's disease and has had intermittent small bowel obstructions. He states this has felt the same. His pain was a sharp crampy pain with nausea and bilious emesis. Overnight, he states he had 2 bowel movements. He states his abdomen was hard, but now it is soft. He feels much better. His main complaint now is discomfort from the nasogastric tube.     Past Medical History:        Diagnosis Date    Arthritis     Back    Depression     Iritis     Nasal polyp     PTSD (post-traumatic stress disorder)     [facility] ([facility])        Past Surgical History:        Procedure Laterality Date    APPENDECTOMY      CARPAL TUNNEL RELEASE      bilateral     COLONOSCOPY  06/20/2018    TI inflamation       Current Medications:   Current Facility-Administered Medications: promethazine (PHENERGAN) injection 6.25 mg, 6.25 mg, Intramuscular, Q6H PRN  methylPREDNISolone sodium (SOLU-MEDROL) injection 60 mg, 60 mg, Intravenous, Daily  sodium chloride flush 0.9 % injection 5-40 mL, 5-40 mL, Intravenous, 2 times per day  sodium chloride flush 0.9 % injection 5-40 mL, 5-40 mL, Intravenous, PRN  0.9 % sodium chloride infusion, 25 mL, Intravenous, PRN  enoxaparin (LOVENOX) injection 40 mg, 40 mg, Subcutaneous, Daily  ondansetron (ZOFRAN-ODT) disintegrating tablet 4 mg, 4 mg, Oral, Q8H PRN **OR** ondansetron (ZOFRAN) injection 4 mg, 4 mg, Intravenous, Q6H PRN  polyethylene glycol (GLYCOLAX) packet 17 g, 17 g, Oral, Daily PRN  acetaminophen (TYLENOL) tablet 650 mg, 650 mg, Oral, Q6H PRN **OR** acetaminophen (TYLENOL) suppository 650 mg, 650 mg, Rectal, Q6H PRN  HYDROmorphone (DILAUDID) injection 0.5 mg, 0.5 mg, Intravenous, Q4H PRN  0.9 % sodium chloride infusion, , Intravenous, Continuous  Prior to Admission medications    Medication Sig Start Date End Date Taking? Authorizing Provider   sertraline (ZOLOFT) 100 MG tablet Take 200 mg by mouth nightly    Yes Historical Provider, MD   buPROPion (WELLBUTRIN XL) 300 MG extended release tablet Take 300 mg by mouth nightly    Yes Historical Provider, MD        Allergies:  Patient has no known allergies. Social History:   TOBACCO:   reports that he has never smoked. He has never used smokeless tobacco.  ETOH:   reports current alcohol use. DRUGS:   reports current drug use. Frequency: 7.00 times per week. Drug: Marijuana. Family History:        Problem Relation Age of Onset    Heart Failure Father     Hypertension Father     Asthma Neg Hx     Cancer Neg Hx     Diabetes Neg Hx     Emphysema Neg Hx        REVIEW OF SYSTEMS:  CONSTITUTIONAL:  negative  HEENT:  negative  RESPIRATORY:  negative  CARDIOVASCULAR:  negative  GASTROINTESTINAL:  negative except for nausea, vomiting and abdominal pain  GENITOURINARY:  negative  HEMATOLOGIC/LYMPHATIC:  negative  NEUROLOGICAL:  Negative  * All other ROS reviewed and negative. PHYSICAL EXAM:  VITALS:  BP (!) 121/52   Pulse 57   Temp 97 °F (36.1 °C) (Oral)   Resp 16   Ht 6' 1\" (1.854 m)   Wt 195 lb 11.2 oz (88.8 kg)   SpO2 100%   BMI 25.82 kg/m²   24HR INTAKE/OUTPUT:    I/O last 3 completed shifts: In: 5 [I.V.:5]  Out: 650 [Urine:550; Emesis/NG output:100]  I/O this shift:   In: 558.1 [I.V.:558.1]  Out: -       CONSTITUTIONAL:  alert, no apparent distress and normal weight  EYES:  PERRL, sclera clear  ENT:  Normocephalic,atraumatic, without obvious abnormality  NECK:  supple, symmetrical, trachea midline  LUNGS: Resp effort easy and unlabored, clear to auscultation  CARDIOVASCULAR:  NO JVD, bradycardic with regular rhythm and no murmur noted  ABDOMEN:  normal bowel sounds, soft, non-distended, non-tender, voluntary guarding absent, no masses palpated and hernia absent  MUSCULOSKELETAL: No clubbing or cyanosis, 0+ pitting edema lower extremities  NEUROLOGIC:  Mental Status Exam:  Level of Alertness:   awake  PSYCHIATRIC:   person, place, time  SKIN:  no bruising or bleeding, normal skin color, texture, turgor and no redness, warmth, or swelling    DATA:    CBC:   Recent Labs     07/02/21 1940 07/03/21  0441   WBC 15.0* 12.1*   HGB 16.8 14.7   HCT 50.6 44.2    130*     BMP:    Recent Labs     07/02/21 1940 07/03/21 0441    141   K 3.7 3.8   CL 99 103   CO2 21 24   BUN 17 17   CREATININE 1.0 0.7*   GLUCOSE 132* 204*     Hepatic:   Recent Labs     07/02/21 1940 07/03/21  0441   AST 19 18   ALT 17 14   BILITOT 0.9 0.9   ALKPHOS 114 104     Mag:    No results for input(s): MG in the last 72 hours. Phos:   No results for input(s): PHOS in the last 72 hours. INR: No results for input(s): INR in the last 72 hours. Radiology Review: Images personally reviewed by me. CT images reviewed and show small bowel obstruction  Abdominal x-rays today show nonspecific bowel gas pattern      IMPRESSION/RECOMMENDATIONS:    Small bowel obstruction secondary to Crohn's stricture. He is clinically significantly improved today with an abdomen that is soft, nondistended, nontender. His bowels are working. At his request, will remove the nasogastric tube and start clear liquids. If he tolerates this, he can be discharged home in the next day or so with colorectal follow-up as already scheduled.     Electronically signed by Jasmeet Rodas MD     [address] [facility]  [id number]

## 2021-07-03 NOTE — ED NOTES
2205- Perfect serve sent to Dr. Saint Chancellor   2220-  returned call and spoke with Dr. Amirah Ngo  07/02/21 David Zambrano  07/02/21 2220

## 2021-07-03 NOTE — PROGRESS NOTES
Pt without co pain or nausea since NG tube has been removed, stating that he feels completely better. Given clear liquids per new order. Denies further needs at this time.

## 2021-07-03 NOTE — PROGRESS NOTES
4 Eyes Skin Assessment     The patient is being assess for   Admission    I agree that 2 RN's have performed a thorough Head to Toe Skin Assessment on the patient. ALL assessment sites listed below have been assessed. Areas assessed for pressure by both nurses:   [x]   Head, Face, and Ears   [x]   Shoulders, Back, and Chest, Abdomen  [x]   Arms, Elbows, and Hands   [x]   Coccyx, Sacrum, and Ischium  [x]   Legs, Feet, and Heels        Skin Assessed Under all Medical Devices by both nurses:  NG                 Abrasion (scratch) to left forearm & shin. **SHARE this note so that the co-signing nurse is able to place an eSignature**    Co-signer eSignature: Electronically signed by Yao Lancaster RN on 7/3/21 at 1:48 AM EDT    Does the Patient have Skin Breakdown related to pressure?   No     Raul Prevention initiated:  No   Wound Care Orders initiated:  No      WOC nurse consulted for Pressure Injury (Stage 3,4, Unstageable, DTI, NWPT, Complex wounds)and New or Established Ostomies:  No      Primary Nurse eSignature: Electronically signed by Lissette Hernandez RN on 7/3/21 at 1:45 AM EDT

## 2021-07-03 NOTE — H&P
Hospital Medicine History & Physical      PCP: Matt Adrian MD    Date of Admission: 7/2/2021     Date of Service: Pt seen/examined on 7/2/2021 and Admitted to Inpatient with expected LOS greater than two midnights due to medical therapy. Chief Complaint:  abd cramping, n.v      History Of Present Illness:      57 y.o. male with history of Crohn's presents following onset of abdominal cramping, nausea, non bloody/bilious emesis today. He complains of chills, but denies fever, dysuria or diarrhea. He has had a cough without sob. Currently he is seen on room air, no distress, alert, oriented c/o nausea    Past Medical History:          Diagnosis Date    Arthritis     Back    Depression     Iritis     Nasal polyp     PTSD (post-traumatic stress disorder)     [facility]BO [facility] bowel obstruction[facility] ([facility])        Past Surgical History:          Procedure Laterality Date    APPENDECTOMY      CARPAL TUNNEL RELEASE      bilateral     COLONOSCOPY  06/20/2018    TI inflamation       Medications Prior to Admission:      Prior to Admission medications    Medication Sig Start Date End Date Taking? Authorizing Provider   sertraline (ZOLOFT) 100 MG tablet Take 200 mg by mouth nightly    Yes Historical Provider, MD   buPROPion (WELLBUTRIN XL) 300 MG extended release tablet Take 300 mg by mouth nightly    Yes Historical Provider, MD       Allergies:  Patient has no known allergies. Social History:           TOBACCO:   reports that he has never smoked. He has never used smokeless tobacco.  ETOH:   reports current alcohol use. Family History:               Problem Relation Age of Onset    Heart Failure Father     Hypertension Father     Asthma Neg Hx     Cancer Neg Hx     Diabetes Neg Hx     Emphysema Neg Hx        REVIEW OF SYSTEMS:   Pertinent positives as noted in the HPI. All other systems reviewed and negative.     PHYSICAL EXAM PERFORMED:    BP (!) 150/77   Pulse 51   Temp 98 °F (36.7 °C) (Oral)   Resp 14   Ht 6' 1\" (1.854 m)   Wt 195 lb 11.2 oz (88.8 kg)   SpO2 100%   BMI 25.82 kg/m²     General appearance:  No apparent distress, appears stated age and cooperative. HEENT:  Normal cephalic, atraumatic without obvious deformity. Pupils equal, round,  Extra ocular muscles intact. Conjunctivae/corneas clear. Neck: Supple, with full range of motion. No jugular venous distention. Trachea midline. Respiratory:  Normal respiratory effort. Clear to auscultation, bilaterally without Rales/Wheezes/Rhonchi. Cardiovascular:  Regular rate and rhythm with normal S1/S2 without murmurs, rubs or gallops. Abdomen: Soft, (+) tender, non-distended with normal bowel sounds. Musculoskeletal:  No clubbing, cyanosis or edema bilaterally. No calf tenderness  Skin: Skin color, texture, turgor normal.  No rashes or lesions. Neurologic:   grossly non-focal.  Psychiatric:  Alert and oriented, thought content appropriate, normal insight  Capillary Refill: Brisk,< 3 seconds   Peripheral Pulses: +2 palpable, equal bilaterally       Labs:     Recent Labs     07/02/21 1940   WBC 15.0*   HGB 16.8   HCT 50.6        Recent Labs     07/02/21 1940      K 3.7   CL 99   CO2 21   BUN 17   CREATININE 1.0   CALCIUM 10.5     Recent Labs     07/02/21 1940   AST 19   ALT 17   BILITOT 0.9   ALKPHOS 114     No results for input(s): INR in the last 72 hours. No results for input(s): Anamika Beat in the last 72 hours. Urinalysis:      Lab Results   Component Value Date    NITRU Negative 07/02/2021    WBCUA 0-2 06/29/2018    RBCUA 0-2 06/29/2018    BLOODU Negative 07/02/2021    SPECGRAV 1.015 07/02/2021    GLUCOSEU Negative 07/02/2021       Radiology:        XR ABDOMEN FOR NG/OG/NE TUBE PLACEMENT   Final Result   Enteric tube tip projects at the gastric body with side hole projecting just   distal to the GE junction. Consider advancement by at least 3 cm.          CT ABDOMEN PELVIS W IV CONTRAST Additional

## 2021-07-03 NOTE — ED NOTES
NG placed and confirmed by air bolus, clear blood tinged green output 100 ml obtained. Pt dry heaves and continues to request more pain medication. POC reviewed that surgery has been consulted and now waiting for admitting order from nocturnist. Verbal understanding received from pt and visitor.

## 2021-07-03 NOTE — PLAN OF CARE
Problem: Infection:  Goal: Will remain free from infection  Description: Will remain free from infection  7/3/2021 1548 by Margarito Busch RN  Outcome: Ongoing  7/3/2021 0154 by Anival Gan RN  Outcome: Ongoing     Problem: Safety:  Goal: Free from accidental physical injury  Description: Free from accidental physical injury  7/3/2021 0154 by Anival Gan RN  Outcome: Ongoing  Goal: Free from intentional harm  Description: Free from intentional harm  7/3/2021 0154 by Anival Gan RN  Outcome: Ongoing     Problem: Daily Care:  Goal: Daily care needs are met  Description: Daily care needs are met  7/3/2021 1548 by Margarito Busch RN  Outcome: Ongoing  7/3/2021 0154 by Anival Gan RN  Outcome: Ongoing     Problem: Pain:  Description: Pain management should include both nonpharmacologic and pharmacologic interventions. Goal: Patient's pain/discomfort is manageable  Description: Patient's pain/discomfort is manageable  7/3/2021 1548 by Margarito Busch RN  Outcome: Ongoing  7/3/2021 0154 by Anival Gan RN  Outcome: Ongoing  Goal: Pain level will decrease  Description: Pain level will decrease  7/3/2021 0154 by Anival Gan RN  Outcome: Ongoing  Goal: Control of acute pain  Description: Control of acute pain  7/3/2021 1548 by Margarito Busch RN  Outcome: Ongoing  7/3/2021 0154 by Anival Gan RN  Outcome: Ongoing     Problem: Skin Integrity:  Goal: Skin integrity will stabilize  Description: Skin integrity will stabilize  7/3/2021 0154 by Anival Gan RN  Outcome: Ongoing     Problem: Discharge Planning:  Goal: Patients continuum of care needs are met  Description: Patients continuum of care needs are met  7/3/2021 0154 by Anival Gan RN  Outcome: Ongoing     Problem:  Activity:  Goal: Risk for activity intolerance will decrease  Description: Risk for activity intolerance will decrease  7/3/2021 0154 by Anival Gan RN  Outcome: Ongoing     Problem: Bowel/Gastric:  Goal: Bowel function will improve  Description: Bowel function will improve  7/3/2021 1548 by Hilda Gordon RN  Outcome: Ongoing  7/3/2021 0154 by Nima Robert RN  Outcome: Ongoing  Goal: Diagnostic test results will improve  Description: Diagnostic test results will improve  7/3/2021 1548 by Hilda Gordon RN  Outcome: Ongoing  7/3/2021 0154 by Nima Robert RN  Outcome: Ongoing  Goal: Occurrences of nausea will decrease  Description: Occurrences of nausea will decrease  7/3/2021 1548 by Hilda Gordon RN  Outcome: Ongoing  7/3/2021 0154 by Nima Robert RN  Outcome: Ongoing  Goal: Occurrences of vomiting will decrease  Description: Occurrences of vomiting will decrease  7/3/2021 1548 by Hilda Gordon RN  Outcome: Ongoing  7/3/2021 0154 by Nima Robert RN  Outcome: Ongoing     Problem: Fluid Volume:  Goal: Maintenance of adequate hydration will improve  Description: Maintenance of adequate hydration will improve  7/3/2021 0154 by Nima Robert RN  Outcome: Ongoing     Problem: Health Behavior:  Goal: Ability to state signs and symptoms to report to health care provider will improve  Description: Ability to state signs and symptoms to report to health care provider will improve  7/3/2021 0154 by Nima Robert RN  Outcome: Ongoing     Problem: Physical Regulation:  Goal: Complications related to the disease process, condition or treatment will be avoided or minimized  Description: Complications related to the disease process, condition or treatment will be avoided or minimized  7/3/2021 0154 by Nima Robert RN  Outcome: Ongoing  Goal: Ability to maintain clinical measurements within normal limits will improve  Description: Ability to maintain clinical measurements within normal limits will improve  7/3/2021 0154 by Nima Robert RN  Outcome: Ongoing     Problem: Sensory:  Goal: Pain level will decrease  Description: Pain level will decrease  7/3/2021 0154 by Sue Arreguin RN  Outcome: Ongoing  Goal: Ability to identify factors that increase the pain will improve  Description: Ability to identify factors that increase the pain will improve  7/3/2021 0154 by Sue Arreguin RN  Outcome: Ongoing  Goal: Ability to notify healthcare provider of pain before it becomes unmanageable or unbearable will improve  Description: Ability to notify healthcare provider of pain before it becomes unmanageable or unbearable will improve  7/3/2021 0154 by Sue Arreguin RN  Outcome: Ongoing

## 2021-07-03 NOTE — PROGRESS NOTES
Consult has been called to Dr. Brown Peña on 7/3/21. Left voicemail.  7:34 AM    Willem Palacios RN  7/3/2021

## 2021-07-04 VITALS
HEART RATE: 83 BPM | DIASTOLIC BLOOD PRESSURE: 62 MMHG | OXYGEN SATURATION: 93 % | SYSTOLIC BLOOD PRESSURE: 121 MMHG | TEMPERATURE: 97 F | HEIGHT: 73 IN | BODY MASS INDEX: 25.94 KG/M2 | RESPIRATION RATE: 16 BRPM | WEIGHT: 195.7 LBS

## 2021-07-04 LAB
ANION GAP SERPL CALCULATED.3IONS-SCNC: 8 MMOL/L (ref 3–16)
BUN BLDV-MCNC: 20 MG/DL (ref 7–20)
CALCIUM SERPL-MCNC: 8.5 MG/DL (ref 8.3–10.6)
CHLORIDE BLD-SCNC: 107 MMOL/L (ref 99–110)
CO2: 25 MMOL/L (ref 21–32)
CREAT SERPL-MCNC: 0.8 MG/DL (ref 0.8–1.3)
GFR AFRICAN AMERICAN: >60
GFR NON-AFRICAN AMERICAN: >60
GLUCOSE BLD-MCNC: 104 MG/DL (ref 70–99)
HCT VFR BLD CALC: 39 % (ref 40.5–52.5)
HEMOGLOBIN: 13.1 G/DL (ref 13.5–17.5)
MCH RBC QN AUTO: 30.1 PG (ref 26–34)
MCHC RBC AUTO-ENTMCNC: 33.6 G/DL (ref 31–36)
MCV RBC AUTO: 89.4 FL (ref 80–100)
PDW BLD-RTO: 15 % (ref 12.4–15.4)
PLATELET # BLD: 113 K/UL (ref 135–450)
PMV BLD AUTO: 6.8 FL (ref 5–10.5)
POTASSIUM REFLEX MAGNESIUM: 3.8 MMOL/L (ref 3.5–5.1)
RBC # BLD: 4.37 M/UL (ref 4.2–5.9)
SODIUM BLD-SCNC: 140 MMOL/L (ref 136–145)
WBC # BLD: 10.9 K/UL (ref 4–11)

## 2021-07-04 PROCEDURE — 36415 COLL VENOUS BLD VENIPUNCTURE: CPT

## 2021-07-04 PROCEDURE — 99231 SBSQ HOSP IP/OBS SF/LOW 25: CPT | Performed by: SURGERY

## 2021-07-04 PROCEDURE — 99238 HOSP IP/OBS DSCHRG MGMT 30/<: CPT | Performed by: INTERNAL MEDICINE

## 2021-07-04 PROCEDURE — 85027 COMPLETE CBC AUTOMATED: CPT

## 2021-07-04 PROCEDURE — 80048 BASIC METABOLIC PNL TOTAL CA: CPT

## 2021-07-04 ASSESSMENT — PAIN SCALES - GENERAL: PAINLEVEL_OUTOF10: 0

## 2021-07-04 NOTE — PROGRESS NOTES
[facility] SURGERY    PATIENT NAME: Winter James     TODAY'S DATE: 7/4/2021    CHIEF COMPLAINT: None    INTERVAL HISTORY/HPI:    Pt states he feels well. He is tolerating a diet, and his bowels are working. REVIEW OF SYSTEMS:  Pertinent positives and negatives as per interval history section    OBJECTIVE:  VITALS:  /62   Pulse 83   Temp 97 °F (36.1 °C) (Oral)   Resp 16   Ht 6' 1\" (1.854 m)   Wt 195 lb 11.2 oz (88.8 kg)   SpO2 93%   BMI 25.82 kg/m²     INTAKE/OUTPUT:    I/O last 3 completed shifts: In: 798.1 [P.O.:240; I.V.:558.1]  Out: -   I/O this shift: In: 480 [P.O.:480]  Out: -     CONSTITUTIONAL:  awake and alert  LUNGS:  Respirations easy and unlabored, clear to auscultation  CARD:  regular rate and rhythm  ABDOMEN:  normal bowel sounds, soft, non-distended, non-tender     Data:  CBC:   Recent Labs     07/02/21 1940 07/03/21 0441 07/04/21 0457   WBC 15.0* 12.1* 10.9   HGB 16.8 14.7 13.1*   HCT 50.6 44.2 39.0*    130* 113*     BMP:    Recent Labs     07/02/21 1940 07/03/21 0441 07/04/21 0457    141 140   K 3.7 3.8 3.8   CL 99 103 107   CO2 21 24 25   BUN 17 17 20   CREATININE 1.0 0.7* 0.8   GLUCOSE 132* 204* 104*     Hepatic:   Recent Labs     07/02/21 1940 07/03/21 0441   AST 19 18   ALT 17 14   BILITOT 0.9 0.9   ALKPHOS 114 104     Mag:    No results for input(s): MG in the last 72 hours. Phos:   No results for input(s): PHOS in the last 72 hours. INR: No results for input(s): INR in the last 72 hours. Radiology Review:  *Imaging personally reviewed by me. N/A      ASSESSMENT AND PLAN:  Small bowel obstruction, resolved. Okay for discharge from surgical standpoint.   He needs a follow-up with GI for long-term treatment of his Crohn's disease     Electronically signed by Maureen Kulkarni MD     [id number]

## 2021-07-04 NOTE — CARE COORDINATION
Review of chart for any potential discharge needs. No needs identified for discharge intervention at this time. Pt states he lives with spouse [employer]PT[employer] +drives. Pt states F.C. met with him and spouse at admit and gave them paperwork to fill out. Encouraged pt to fill out paperwork and he v/u. MD and bedside RN  if needs arise please consult case management for discharge intervention. CM not following at this time.

## 2021-07-04 NOTE — DISCHARGE SUMMARY
Name:  Meghan Oneal  Room:  5349/0801-47  MRN:    [id number]    Discharge Summary      This discharge summary is in conjunction with a complete physical exam done on the day of discharge. Attending Physician:  Dr. Moriah Dean    Discharging Physician: Dr. Dasia Mejiaover: 7/2/2021  Discharge:   7/4/2021    Diagnoses this Admission    Principal Problem:    SBO (small bowel obstruction) ([address]  Active Problems:    Depression    PTSD (post-traumatic stress disorder)    Leucocytosis    Lactic acidosis  Resolved Problems:    * No resolved hospital problems. *      Procedures (Please Review Full Report for Details)  N/A      Consults    General surgery    HPI:  57 y.o. male with history of Crohn's presents following onset of abdominal cramping, nausea, non bloody/bilious emesis today. He complains of chills, but denies fever, dysuria or diarrhea. He has had a cough without sob. Currently he is seen on room air, no distress, alert, oriented c/o nausea    Physical Exam at Discharge:  /62   Pulse 83   Temp 97 °F (36.1 °C) (Oral)   Resp 16   Ht 6' 1\" (1.854 m)   Wt 195 lb 11.2 oz (88.8 kg)   SpO2 93%   BMI 25.82 kg/m²     General appearance: alert, appears stated age and cooperative  Head: Normocephalic, without obvious abnormality, atraumatic  Eyes: conjunctivae/corneas clear. PERRL, EOM's intact. Neck: no adenopathy, no carotid bruit, no JVD, supple, symmetrical  Lungs: clear to auscultation bilaterally  Heart: regular rate and rhythm, S1, S2 normal, no murmur, click, rub or gallop  Abdomen: soft, non-tender; bowel sounds hypoactive; no masses,  no organomegaly  Extremities: extremities normal, atraumatic, no cyanosis or edema  Pulses: 2+ and symmetric  Skin: Skin color, texture, turgor normal. No rashes or lesions  Neurologic: Grossly normal       Hospital Course    SBO  - admitted to med-surg  - Gen surg consult  - NPO, IVF's  - pain control: Dilaudid prn  - NG tube to LWS  - Xray improved.  NG removed and diet -clear started. No pain. Feeling much improved. Passing flatus. Chayitopatrizia Marin for discharge. Instructed to have a full liquid diet at home for a few days.      Crohn's disease  - got IV Solumedrol. No bloody diarrhea or abdominal pain. Stopped steroids. Outpatient GI follow-up.     Leukocytosis resolved  Lactic acidosis resolved  - due to above. - likely reactive.      Bradycardia  - resolved.      Depression, PTSD  - resumed home regimen when able. CBC:   Recent Labs     07/02/21 1940 07/03/21 0441 07/04/21 0457   WBC 15.0* 12.1* 10.9   HGB 16.8 14.7 13.1*   HCT 50.6 44.2 39.0*   MCV 89.8 89.1 89.4    130* 113*     BMP:   Recent Labs     07/02/21 1940 07/03/21 0441 07/04/21 0457    141 140   K 3.7 3.8 3.8   CL 99 103 107   CO2 21 24 25   BUN 17 17 20   CREATININE 1.0 0.7* 0.8     LIVER PROFILE:   Recent Labs     07/02/21 1940 07/03/21 0441   AST 19 18   ALT 17 14   LIPASE 34.0  --    BILIDIR  --  <0.2   BILITOT 0.9 0.9   ALKPHOS 114 104       Cultures:   COVID: not detected      UA:  Recent Labs     07/02/21  2326   COLORU Yellow   PHUR 8.0   CLARITYU Clear   SPECGRAV 1.015   LEUKOCYTESUR Negative   UROBILINOGEN 0.2   BILIRUBINUR Negative   BLOODU Negative   GLUCOSEU Negative        XR ABDOMEN (2 VIEWS)   Final Result   Nasogastric tube projects in normal position. Nonspecific bowel gas pattern. XR ABDOMEN FOR NG/OG/NE TUBE PLACEMENT   Final Result   Enteric tube tip projects at the gastric body with side hole projecting just   distal to the GE junction. Consider advancement by at least 3 cm. CT ABDOMEN PELVIS W IV CONTRAST Additional Contrast? None   Final Result   1. Small-bowel obstruction with transition point in the proximal to mid   ileum. New fat attenuation focus measuring 2.1 cm by 0.7 cm in the lumen of   the ileum at the transition point could represent ingested material causing   obstruction due to underlying narrowing of ileum.    2. Other findings as described. Discharge Medications     Medication List      CONTINUE taking these medications    buPROPion 300 MG extended release tablet  Commonly known as: WELLBUTRIN XL     sertraline 100 MG tablet  Commonly known as: ZOLOFT              Discharge Condition/Location: Stable/home    Follow Up: Follow up with PCP.   Outpatient GI follow-up        Ashlie Cantu MD 7/4/2021 9:26 AM

## 2021-07-04 NOTE — PLAN OF CARE
Problem: Infection:  Goal: Will remain free from infection  7/4/2021 1024 by Annamarie Franco RN  Outcome: Ongoing  7/3/2021 2037 by Aleena Flores RN  Outcome: Ongoing     Problem: Safety:  Goal: Free from accidental physical injury  7/4/2021 1024 by Annamarie Franco RN  Outcome: Ongoing  7/3/2021 2037 by Aleena Flores RN  Outcome: Ongoing  Goal: Free from intentional harm  7/4/2021 1024 by Annamarie Franco RN  Outcome: Ongoing  7/3/2021 2037 by Aleena Flores RN  Outcome: Ongoing     Problem: Daily Care:  Goal: Daily care needs are met  7/4/2021 1024 by Annamarie Franco RN  Outcome: Ongoing  7/3/2021 2037 by Aleena Flores RN  Outcome: Ongoing     Problem: Pain:  Goal: Patient's pain/discomfort is manageable  7/4/2021 1024 by Annamarie Franco RN  Outcome: Ongoing  7/3/2021 2037 by Aleena Flores RN  Outcome: Ongoing  Goal: Pain level will decrease  7/4/2021 1024 by Annamarie Franco RN  Outcome: Ongoing  7/3/2021 2037 by Aleena Flores RN  Outcome: Ongoing  Goal: Control of acute pain  7/4/2021 1024 by Annamarie Franco RN  Outcome: Ongoing  7/3/2021 2037 by Aleena Flores RN  Outcome: Ongoing  Goal: Control of chronic pain  7/4/2021 1024 by Annamarie Franco RN  Outcome: Ongoing  7/3/2021 2037 by Aleena Flores RN  Outcome: Ongoing     Problem: Skin Integrity:  Goal: Skin integrity will stabilize  7/4/2021 1024 by Annamarie Franco RN  Outcome: Ongoing  7/3/2021 2037 by Aleena Flores RN  Outcome: Ongoing     Problem: Discharge Planning:  Goal: Patients continuum of care needs are met  7/4/2021 1024 by Annamarie Franco RN  Outcome: Ongoing  7/3/2021 2037 by Aleena Flores RN  Outcome: Ongoing     Problem: Activity:  Goal: Risk for activity intolerance will decrease  7/4/2021 1024 by Annamarie Franco RN  Outcome: Ongoing  7/3/2021 2037 by Aleena Flores RN  Outcome: Ongoing     Problem:  Bowel/Gastric:  Goal: Bowel function will improve  7/4/2021 1024 1024 by Ishmael Torres RN  Outcome: Ongoing  7/3/2021 2037 by Vidhi eMneses RN  Outcome: Ongoing

## 2021-07-04 NOTE — PLAN OF CARE
Problem: Infection:  Goal: Will remain free from infection  7/4/2021 1102 by Tanvir Parkinson RN  Outcome: Ongoing  7/4/2021 1024 by Tanvir Parkinson RN  Outcome: Ongoing     Problem: Safety:  Goal: Free from accidental physical injury  7/4/2021 1102 by Tanvir Parkinson RN  Outcome: Ongoing  7/4/2021 1024 by Tanvir Parkinson RN  Outcome: Ongoing  Goal: Free from intentional harm  7/4/2021 1102 by Tanvir Parkinson RN  Outcome: Ongoing  7/4/2021 1024 by Tanvir Parkinson RN  Outcome: Ongoing     Problem: Daily Care:  Goal: Daily care needs are met  7/4/2021 1102 by Tanvir Parkinson RN  Outcome: Ongoing  7/4/2021 1024 by Tanvir Parkinson RN  Outcome: Ongoing     Problem: Pain:  Goal: Patient's pain/discomfort is manageable  7/4/2021 1102 by Tanvir Parkinson RN  Outcome: Ongoing  7/4/2021 1024 by Tanvir Parkinson RN  Outcome: Ongoing  Goal: Pain level will decrease  7/4/2021 1102 by Tanvir Parkinson RN  Outcome: Ongoing  7/4/2021 1024 by Tanvir Parkinson RN  Outcome: Ongoing  Goal: Control of acute pain  7/4/2021 1102 by Tanvir Parkinson RN  Outcome: Ongoing  7/4/2021 1024 by Tanvir Parkinson RN  Outcome: Ongoing  Goal: Control of chronic pain  7/4/2021 1102 by Tanvir Parkinson RN  Outcome: Ongoing  7/4/2021 1024 by Tanvir Parkinson RN  Outcome: Ongoing     Problem: Skin Integrity:  Goal: Skin integrity will stabilize  7/4/2021 1102 by Tanvir Parkinson RN  Outcome: Ongoing  7/4/2021 1024 by Tanvir Parkinson RN  Outcome: Ongoing     Problem: Discharge Planning:  Goal: Patients continuum of care needs are met  7/4/2021 1102 by Tanvir Parkinson RN  Outcome: Ongoing  7/4/2021 1024 by Tanvir Parkinson RN  Outcome: Ongoing     Problem: Activity:  Goal: Risk for activity intolerance will decrease  7/4/2021 1102 by Tanvir Parkinson RN  Outcome: Ongoing  7/4/2021 1024 by Tanvir Parkinson RN  Outcome: Ongoing     Problem:  Bowel/Gastric:  Goal: Bowel function will improve  7/4/2021 1102 by Sheba Rowell RN  Outcome: Ongoing  7/4/2021 1024 by Sheba Rowell RN  Outcome: Ongoing  Goal: Diagnostic test results will improve  7/4/2021 1102 by Sheba Rowell RN  Outcome: Ongoing  7/4/2021 1024 by Sheba Rowell RN  Outcome: Ongoing  Goal: Occurrences of nausea will decrease  7/4/2021 1102 by Sheba Rowell RN  Outcome: Ongoing  7/4/2021 1024 by Sheba Rowell RN  Outcome: Ongoing  Goal: Occurrences of vomiting will decrease  7/4/2021 1102 by Sheba Rowell RN  Outcome: Ongoing  7/4/2021 1024 by Sheba Rowell RN  Outcome: Ongoing     Problem: Fluid Volume:  Goal: Maintenance of adequate hydration will improve  7/4/2021 1102 by Sheba Rowell RN  Outcome: Ongoing  7/4/2021 1024 by Sheba Rowell RN  Outcome: Ongoing     Problem: Health Behavior:  Goal: Ability to state signs and symptoms to report to health care provider will improve  7/4/2021 1102 by Sheba Rowell RN  Outcome: Ongoing  7/4/2021 1024 by Sheba Rowell RN  Outcome: Ongoing     Problem: Physical Regulation:  Goal: Complications related to the disease process, condition or treatment will be avoided or minimized  7/4/2021 1102 by Sheba Rowell RN  Outcome: Ongoing  7/4/2021 1024 by Sheba Rowell RN  Outcome: Ongoing  Goal: Ability to maintain clinical measurements within normal limits will improve  7/4/2021 1102 by Sheba Rowell RN  Outcome: Ongoing  7/4/2021 1024 by Sheba Rowell RN  Outcome: Ongoing     Problem: Sensory:  Goal: Pain level will decrease  7/4/2021 1102 by Sheba Rowell RN  Outcome: Ongoing  7/4/2021 1024 by Sheba Rowell RN  Outcome: Ongoing  Goal: Ability to identify factors that increase the pain will improve  7/4/2021 1102 by Sheba Rowell RN  Outcome: Ongoing  7/4/2021 1024 by Sheba Rowell RN  Outcome: Ongoing  Goal: Ability to notify healthcare provider of pain before it becomes unmanageable or unbearable will improve  7/4/2021 1102 by Stephanie Brice RN  Outcome: Ongoing  7/4/2021 1024 by Stephanie Brice RN  Outcome: Ongoing

## 2021-07-04 NOTE — PLAN OF CARE
Problem: Infection:  Goal: Will remain free from infection  Description: Will remain free from infection  7/3/2021 2037 by Leann Wilks RN  Outcome: Ongoing  7/3/2021 1548 by Hilda Esteves RN  Outcome: Ongoing     Problem: Safety:  Goal: Free from accidental physical injury  Description: Free from accidental physical injury  Outcome: Ongoing  Goal: Free from intentional harm  Description: Free from intentional harm  Outcome: Ongoing     Problem: Daily Care:  Goal: Daily care needs are met  Description: Daily care needs are met  7/3/2021 2037 by Leann Wilks RN  Outcome: Ongoing  7/3/2021 1548 by Hilda Esteves RN  Outcome: Ongoing     Problem: Pain:  Goal: Patient's pain/discomfort is manageable  Description: Patient's pain/discomfort is manageable  7/3/2021 2037 by Leann Wilks RN  Outcome: Ongoing  7/3/2021 1548 by Hilda Esteves RN  Outcome: Ongoing  Goal: Pain level will decrease  Description: Pain level will decrease  Outcome: Ongoing  Goal: Control of acute pain  Description: Control of acute pain  7/3/2021 2037 by Leann Wilks RN  Outcome: Ongoing  7/3/2021 1548 by Hilda Esteves RN  Outcome: Ongoing  Goal: Control of chronic pain  Description: Control of chronic pain  Outcome: Ongoing     Problem: Skin Integrity:  Goal: Skin integrity will stabilize  Description: Skin integrity will stabilize  Outcome: Ongoing     Problem: Discharge Planning:  Goal: Patients continuum of care needs are met  Description: Patients continuum of care needs are met  Outcome: Ongoing     Problem: Activity:  Goal: Risk for activity intolerance will decrease  Description: Risk for activity intolerance will decrease  Outcome: Ongoing     Problem:  Bowel/Gastric:  Goal: Bowel function will improve  Description: Bowel function will improve  7/3/2021 2037 by Leann Wilks RN  Outcome: Ongoing  7/3/2021 1548 by Hilda Esteves RN  Outcome: Ongoing  Goal: Diagnostic test results will improve  Description: Diagnostic test results will improve  7/3/2021 2037 by Humera Yates RN  Outcome: Ongoing  7/3/2021 1548 by Silke Rebolledo RN  Outcome: Ongoing  Goal: Occurrences of nausea will decrease  Description: Occurrences of nausea will decrease  7/3/2021 2037 by Humera Yates RN  Outcome: Ongoing  7/3/2021 1548 by Silke Rebolledo RN  Outcome: Ongoing  Goal: Occurrences of vomiting will decrease  Description: Occurrences of vomiting will decrease  7/3/2021 2037 by Humera Yates RN  Outcome: Ongoing  7/3/2021 1548 by Silke Rebolledo RN  Outcome: Ongoing     Problem: Fluid Volume:  Goal: Maintenance of adequate hydration will improve  Description: Maintenance of adequate hydration will improve  Outcome: Ongoing     Problem: Health Behavior:  Goal: Ability to state signs and symptoms to report to health care provider will improve  Description: Ability to state signs and symptoms to report to health care provider will improve  Outcome: Ongoing     Problem: Physical Regulation:  Goal: Complications related to the disease process, condition or treatment will be avoided or minimized  Description: Complications related to the disease process, condition or treatment will be avoided or minimized  Outcome: Ongoing  Goal: Ability to maintain clinical measurements within normal limits will improve  Description: Ability to maintain clinical measurements within normal limits will improve  Outcome: Ongoing     Problem: Sensory:  Goal: Pain level will decrease  Description: Pain level will decrease  Outcome: Ongoing  Goal: Ability to identify factors that increase the pain will improve  Description: Ability to identify factors that increase the pain will improve  Outcome: Ongoing  Goal: Ability to notify healthcare provider of pain before it becomes unmanageable or unbearable will improve  Description: Ability to notify healthcare provider of pain before it becomes unmanageable or unbearable will improve  Outcome: Ongoing

## 2021-10-02 ENCOUNTER — APPOINTMENT (OUTPATIENT)
Dept: CT IMAGING | Age: 61
End: 2021-10-02

## 2021-10-02 ENCOUNTER — HOSPITAL ENCOUNTER (EMERGENCY)
Age: 61
Discharge: HOME | End: 2021-10-02
Attending: EMERGENCY MEDICINE
Payer: COMMERCIAL

## 2021-10-02 VITALS
BODY MASS INDEX: 23.86 KG/M2 | DIASTOLIC BLOOD PRESSURE: 66 MMHG | OXYGEN SATURATION: 98 % | RESPIRATION RATE: 16 BRPM | HEART RATE: 55 BPM | WEIGHT: 180 LBS | TEMPERATURE: 97.7 F | SYSTOLIC BLOOD PRESSURE: 140 MMHG | HEIGHT: 73 IN

## 2021-10-02 DIAGNOSIS — F12.90 MARIJUANA USE: ICD-10-CM

## 2021-10-02 DIAGNOSIS — K57.90 DIVERTICULOSIS: ICD-10-CM

## 2021-10-02 DIAGNOSIS — R16.1 SPLENOMEGALY: ICD-10-CM

## 2021-10-02 DIAGNOSIS — K80.20 CALCULUS OF GALLBLADDER WITHOUT CHOLECYSTITIS WITHOUT OBSTRUCTION: ICD-10-CM

## 2021-10-02 DIAGNOSIS — K76.6 PORTAL HYPERTENSION (HCC): ICD-10-CM

## 2021-10-02 DIAGNOSIS — K40.20 BILATERAL INGUINAL HERNIA WITHOUT OBSTRUCTION OR GANGRENE, RECURRENCE NOT SPECIFIED: ICD-10-CM

## 2021-10-02 DIAGNOSIS — R11.2 NON-INTRACTABLE VOMITING WITH NAUSEA, UNSPECIFIED VOMITING TYPE: Primary | ICD-10-CM

## 2021-10-02 LAB
A/G RATIO: 1.5 (ref 1.1–2.2)
ALBUMIN SERPL-MCNC: 4.1 G/DL (ref 3.4–5)
ALP BLD-CCNC: 93 U/L (ref 40–129)
ALT SERPL-CCNC: 17 U/L (ref 10–40)
AMPHETAMINE SCREEN, URINE: ABNORMAL
ANION GAP SERPL CALCULATED.3IONS-SCNC: 16 MMOL/L (ref 3–16)
AST SERPL-CCNC: 27 U/L (ref 15–37)
BARBITURATE SCREEN URINE: ABNORMAL
BASOPHILS ABSOLUTE: 0 K/UL (ref 0–0.2)
BASOPHILS RELATIVE PERCENT: 0.3 %
BENZODIAZEPINE SCREEN, URINE: ABNORMAL
BILIRUB SERPL-MCNC: 1 MG/DL (ref 0–1)
BUN BLDV-MCNC: 22 MG/DL (ref 7–20)
CALCIUM SERPL-MCNC: 9.1 MG/DL (ref 8.3–10.6)
CANNABINOID SCREEN URINE: POSITIVE
CHLORIDE BLD-SCNC: 103 MMOL/L (ref 99–110)
CO2: 21 MMOL/L (ref 21–32)
COCAINE METABOLITE SCREEN URINE: ABNORMAL
CREAT SERPL-MCNC: 0.8 MG/DL (ref 0.8–1.3)
EKG ATRIAL RATE: 61 BPM
EKG DIAGNOSIS: NORMAL
EKG P AXIS: 30 DEGREES
EKG P-R INTERVAL: 150 MS
EKG Q-T INTERVAL: 450 MS
EKG QRS DURATION: 88 MS
EKG QTC CALCULATION (BAZETT): 453 MS
EKG R AXIS: 31 DEGREES
EKG T AXIS: 53 DEGREES
EKG VENTRICULAR RATE: 61 BPM
EOSINOPHILS ABSOLUTE: 0.1 K/UL (ref 0–0.6)
EOSINOPHILS RELATIVE PERCENT: 0.5 %
GFR AFRICAN AMERICAN: >60
GFR NON-AFRICAN AMERICAN: >60
GLOBULIN: 2.8 G/DL
GLUCOSE BLD-MCNC: 202 MG/DL (ref 70–99)
HCT VFR BLD CALC: 43.5 % (ref 40.5–52.5)
HEMOGLOBIN: 14.6 G/DL (ref 13.5–17.5)
LIPASE: 93 U/L (ref 13–60)
LYMPHOCYTES ABSOLUTE: 0.9 K/UL (ref 1–5.1)
LYMPHOCYTES RELATIVE PERCENT: 7.2 %
Lab: ABNORMAL
MCH RBC QN AUTO: 29.3 PG (ref 26–34)
MCHC RBC AUTO-ENTMCNC: 33.6 G/DL (ref 31–36)
MCV RBC AUTO: 87.2 FL (ref 80–100)
METHADONE SCREEN, URINE: ABNORMAL
MONOCYTES ABSOLUTE: 0.6 K/UL (ref 0–1.3)
MONOCYTES RELATIVE PERCENT: 4.9 %
NEUTROPHILS ABSOLUTE: 11.3 K/UL (ref 1.7–7.7)
NEUTROPHILS RELATIVE PERCENT: 87.1 %
OPIATE SCREEN URINE: POSITIVE
OXYCODONE URINE: ABNORMAL
PDW BLD-RTO: 14.6 % (ref 12.4–15.4)
PH UA: 7
PHENCYCLIDINE SCREEN URINE: ABNORMAL
PLATELET # BLD: 133 K/UL (ref 135–450)
PMV BLD AUTO: 6.5 FL (ref 5–10.5)
POTASSIUM REFLEX MAGNESIUM: 4.5 MMOL/L (ref 3.5–5.1)
PROPOXYPHENE SCREEN: ABNORMAL
RBC # BLD: 4.99 M/UL (ref 4.2–5.9)
SODIUM BLD-SCNC: 140 MMOL/L (ref 136–145)
TOTAL PROTEIN: 6.9 G/DL (ref 6.4–8.2)
WBC # BLD: 13 K/UL (ref 4–11)

## 2021-10-02 PROCEDURE — 96375 TX/PRO/DX INJ NEW DRUG ADDON: CPT

## 2021-10-02 PROCEDURE — 6360000002 HC RX W HCPCS

## 2021-10-02 PROCEDURE — 6360000002 HC RX W HCPCS: Performed by: EMERGENCY MEDICINE

## 2021-10-02 PROCEDURE — 6360000004 HC RX CONTRAST MEDICATION: Performed by: EMERGENCY MEDICINE

## 2021-10-02 PROCEDURE — 93010 ELECTROCARDIOGRAM REPORT: CPT | Performed by: INTERNAL MEDICINE

## 2021-10-02 PROCEDURE — 85025 COMPLETE CBC W/AUTO DIFF WBC: CPT

## 2021-10-02 PROCEDURE — 80053 COMPREHEN METABOLIC PANEL: CPT

## 2021-10-02 PROCEDURE — 74177 CT ABD & PELVIS W/CONTRAST: CPT

## 2021-10-02 PROCEDURE — 2580000003 HC RX 258: Performed by: EMERGENCY MEDICINE

## 2021-10-02 PROCEDURE — 80307 DRUG TEST PRSMV CHEM ANLYZR: CPT

## 2021-10-02 PROCEDURE — 93005 ELECTROCARDIOGRAM TRACING: CPT | Performed by: EMERGENCY MEDICINE

## 2021-10-02 PROCEDURE — 96372 THER/PROPH/DIAG INJ SC/IM: CPT

## 2021-10-02 PROCEDURE — 36415 COLL VENOUS BLD VENIPUNCTURE: CPT

## 2021-10-02 PROCEDURE — 96374 THER/PROPH/DIAG INJ IV PUSH: CPT

## 2021-10-02 PROCEDURE — 83690 ASSAY OF LIPASE: CPT

## 2021-10-02 PROCEDURE — 99285 EMERGENCY DEPT VISIT HI MDM: CPT

## 2021-10-02 RX ORDER — PROMETHAZINE HYDROCHLORIDE 25 MG/1
25 TABLET ORAL 4 TIMES DAILY PRN
Qty: 20 TABLET | Refills: 0 | Status: SHIPPED | OUTPATIENT
Start: 2021-10-02 | End: 2021-10-09

## 2021-10-02 RX ORDER — METOCLOPRAMIDE HYDROCHLORIDE 5 MG/ML
10 INJECTION INTRAMUSCULAR; INTRAVENOUS ONCE
Status: COMPLETED | OUTPATIENT
Start: 2021-10-02 | End: 2021-10-02

## 2021-10-02 RX ORDER — DROPERIDOL 2.5 MG/ML
1.25 INJECTION, SOLUTION INTRAMUSCULAR; INTRAVENOUS EVERY 6 HOURS PRN
Status: DISCONTINUED | OUTPATIENT
Start: 2021-10-02 | End: 2021-10-02 | Stop reason: HOSPADM

## 2021-10-02 RX ORDER — PROMETHAZINE HYDROCHLORIDE 25 MG/1
25 SUPPOSITORY RECTAL EVERY 6 HOURS PRN
Qty: 6 SUPPOSITORY | Refills: 0 | Status: SHIPPED | OUTPATIENT
Start: 2021-10-02 | End: 2021-10-09

## 2021-10-02 RX ORDER — PROMETHAZINE HYDROCHLORIDE 25 MG/ML
INJECTION, SOLUTION INTRAMUSCULAR; INTRAVENOUS
Status: COMPLETED
Start: 2021-10-02 | End: 2021-10-02

## 2021-10-02 RX ORDER — SODIUM CHLORIDE, SODIUM LACTATE, POTASSIUM CHLORIDE, AND CALCIUM CHLORIDE .6; .31; .03; .02 G/100ML; G/100ML; G/100ML; G/100ML
1000 INJECTION, SOLUTION INTRAVENOUS ONCE
Status: COMPLETED | OUTPATIENT
Start: 2021-10-02 | End: 2021-10-02

## 2021-10-02 RX ORDER — MORPHINE SULFATE 2 MG/ML
4 INJECTION, SOLUTION INTRAMUSCULAR; INTRAVENOUS
Status: DISCONTINUED | OUTPATIENT
Start: 2021-10-02 | End: 2021-10-02 | Stop reason: HOSPADM

## 2021-10-02 RX ORDER — ONDANSETRON 2 MG/ML
4 INJECTION INTRAMUSCULAR; INTRAVENOUS
Status: DISCONTINUED | OUTPATIENT
Start: 2021-10-02 | End: 2021-10-02 | Stop reason: HOSPADM

## 2021-10-02 RX ORDER — PROMETHAZINE HYDROCHLORIDE 25 MG/ML
25 INJECTION, SOLUTION INTRAMUSCULAR; INTRAVENOUS ONCE
Status: COMPLETED | OUTPATIENT
Start: 2021-10-02 | End: 2021-10-02

## 2021-10-02 RX ADMIN — IOPAMIDOL 75 ML: 755 INJECTION, SOLUTION INTRAVENOUS at 06:23

## 2021-10-02 RX ADMIN — ONDANSETRON 4 MG: 2 INJECTION INTRAMUSCULAR; INTRAVENOUS at 05:42

## 2021-10-02 RX ADMIN — PROMETHAZINE HYDROCHLORIDE 25 MG: 25 INJECTION INTRAMUSCULAR; INTRAVENOUS at 06:44

## 2021-10-02 RX ADMIN — METOCLOPRAMIDE 10 MG: 5 INJECTION, SOLUTION INTRAMUSCULAR; INTRAVENOUS at 07:31

## 2021-10-02 RX ADMIN — PROMETHAZINE HYDROCHLORIDE 25 MG: 25 INJECTION, SOLUTION INTRAMUSCULAR; INTRAVENOUS at 06:44

## 2021-10-02 RX ADMIN — MORPHINE SULFATE 4 MG: 2 INJECTION, SOLUTION INTRAMUSCULAR; INTRAVENOUS at 05:43

## 2021-10-02 RX ADMIN — SODIUM CHLORIDE, POTASSIUM CHLORIDE, SODIUM LACTATE AND CALCIUM CHLORIDE 1000 ML: 600; 310; 30; 20 INJECTION, SOLUTION INTRAVENOUS at 05:47

## 2021-10-02 RX ADMIN — DROPERIDOL 1.25 MG: 2.5 INJECTION, SOLUTION INTRAMUSCULAR; INTRAVENOUS at 08:04

## 2021-10-02 ASSESSMENT — PAIN SCALES - GENERAL
PAINLEVEL_OUTOF10: 4
PAINLEVEL_OUTOF10: 4

## 2021-10-02 NOTE — ED NOTES
Pt comes in tonight c/o nausea and vomiting, diarrhea that all started at 2100 last night. 4mg zofran given in squad with no relief. Pt asking for more nausea meds.        Gama Puckett RN  10/02/21 0674

## 2021-10-02 NOTE — ED NOTES
Pt given crackers and water and told to try to see if he can tolerate Aidan Paniagua RN  10/02/21 0927

## 2021-10-02 NOTE — ED PROVIDER NOTES
Patient was signed out to me from Dr. Raymon Gupta at 6 AM.      Briefly, patient has a history of recurrent bowel obstructions. He states that he woke up this morning with severe nausea and vomiting. Patient denies fevers, chills, or sweats. No cough, congestion. No lower extremity swelling or edema. Physical exam: General: No acute distress. Head: Normocephalic/atraumatic. Heart: Regular rate rhythm. Normal S1-S2. Abdomen: Soft. Nontender, nondistended. No rebound, guarding. LABS  I have reviewed all labs for this visit.    Results for orders placed or performed during the hospital encounter of 10/02/21   CBC Auto Differential   Result Value Ref Range    WBC 13.0 (H) 4.0 - 11.0 K/uL    RBC 4.99 4.20 - 5.90 M/uL    Hemoglobin 14.6 13.5 - 17.5 g/dL    Hematocrit 43.5 40.5 - 52.5 %    MCV 87.2 80.0 - 100.0 fL    MCH 29.3 26.0 - 34.0 pg    MCHC 33.6 31.0 - 36.0 g/dL    RDW 14.6 12.4 - 15.4 %    Platelets 865 (L) 851 - 450 K/uL    MPV 6.5 5.0 - 10.5 fL    Neutrophils % 87.1 %    Lymphocytes % 7.2 %    Monocytes % 4.9 %    Eosinophils % 0.5 %    Basophils % 0.3 %    Neutrophils Absolute 11.3 (H) 1.7 - 7.7 K/uL    Lymphocytes Absolute 0.9 (L) 1.0 - 5.1 K/uL    Monocytes Absolute 0.6 0.0 - 1.3 K/uL    Eosinophils Absolute 0.1 0.0 - 0.6 K/uL    Basophils Absolute 0.0 0.0 - 0.2 K/uL   Comprehensive Metabolic Panel w/ Reflex to MG   Result Value Ref Range    Sodium 140 136 - 145 mmol/L    Potassium reflex Magnesium 4.5 3.5 - 5.1 mmol/L    Chloride 103 99 - 110 mmol/L    CO2 21 21 - 32 mmol/L    Anion Gap 16 3 - 16    Glucose 202 (H) 70 - 99 mg/dL    BUN 22 (H) 7 - 20 mg/dL    CREATININE 0.8 0.8 - 1.3 mg/dL    GFR Non-African American >60 >60    GFR African American >60 >60    Calcium 9.1 8.3 - 10.6 mg/dL    Total Protein 6.9 6.4 - 8.2 g/dL    Albumin 4.1 3.4 - 5.0 g/dL    Albumin/Globulin Ratio 1.5 1.1 - 2.2    Total Bilirubin 1.0 0.0 - 1.0 mg/dL    Alkaline Phosphatase 93 40 - 129 U/L ALT 17 10 - 40 U/L    AST 27 15 - 37 U/L    Globulin 2.8 g/dL   Lipase   Result Value Ref Range    Lipase 93.0 (H) 13.0 - 60.0 U/L   Urine Drug Screen   Result Value Ref Range    Amphetamine Screen, Urine Neg Negative <1000ng/mL    Barbiturate Screen, Ur Neg Negative <200 ng/mL    Benzodiazepine Screen, Urine Neg Negative <200 ng/mL    Cannabinoid Scrn, Ur POSITIVE (A) Negative <50 ng/mL    Cocaine Metabolite Screen, Urine Neg Negative <300 ng/mL    Opiate Scrn, Ur POSITIVE (A) Negative <300 ng/mL    PCP Screen, Urine Neg Negative <25 ng/mL    Methadone Screen, Urine Neg Negative <300 ng/mL    Propoxyphene Scrn, Ur Neg Negative <300 ng/mL    Oxycodone Urine Neg Negative <100 ng/ml    pH, UA 7.0     Drug Screen Comment: see below    EKG 12 Lead   Result Value Ref Range    Ventricular Rate 61 BPM    Atrial Rate 61 BPM    P-R Interval 150 ms    QRS Duration 88 ms    Q-T Interval 450 ms    QTc Calculation (Bazett) 453 ms    P Axis 30 degrees    R Axis 31 degrees    T Axis 53 degrees    Diagnosis       Sinus rhythm with Premature supraventricular complexes with occasional Premature ventricular complexesOtherwise normal ECGWhen compared with ECG of 27-MAY-2018 00:18,Fusion complexes are no longer PresentPremature ventricular complexes are now Present       EKG  Sinus rhythm with PVCs and PACs. Rate of 61. Normal axis. Normal intervals and durations. No ST or T wave changes appreciated. Cardiac Monitoring: Ectopy. Normal rate. RADIOLOGY  X-RAYS:  I have reviewed radiologic plain film image(s). ALL OTHER NON-PLAIN FILM IMAGES SUCH AS CT, ULTRASOUND AND MRI HAVE BEEN READ BY THE RADIOLOGIST. CT ABDOMEN PELVIS W IV CONTRAST Additional Contrast? None   Final Result   1. Marked enlargement of main portal vein consistent with presence of portal   venous hypertension. Recommend clinical correlation for association with   hepatocellular disease. 2. Associated severe splenomegaly.    3. Cholelithiasis, as discussed above. 4. Severe sigmoid colon diverticulosis without evidence of diverticulitis. 5. Posterior right urinary bladder diverticulum, as discussed above. 6. Bilateral small indirect inguinal hernia without evidence of bowel   involvement. 7. Interval resolution of small bowel obstruction versus ileus. Rechecks: Physical assessment performed. No vomiting observed. Patient tolerating water. Declines attempts at crackers. ED COURSE/MDM  Patient seen and evaluated. Old records reviewed. Labs and imaging reviewed and results discussed with patient. Patient was given normal saline, Zofran, Phenergan, Reglan, Inapsine, and morphine. . Patient was reassessed as noted above . No acute pathology was noted and pt is safe for discharge home to follow up with PCP. I discussed the potential for cannabis hyperemesis syndrome with patient. Labs and imaging are stable. . Plan of care discussed with patient and family. Patient and family in agreement with plan. Patient was given scripts for the following medications. I counseled patient how to take these medications. Discharge Medication List as of 10/2/2021 11:02 AM      START taking these medications    Details   promethazine (PROMETHEGAN) 25 MG suppository Place 1 suppository rectally every 6 hours as needed for Nausea, Disp-6 suppository, R-0Normal      promethazine (PHENERGAN) 25 MG tablet Take 1 tablet by mouth 4 times daily as needed for Nausea, Disp-20 tablet, R-0Normal             CLINICAL IMPRESSION  1. Non-intractable vomiting with nausea, unspecified vomiting type    2. Marijuana use    3. Portal hypertension (HCC)    4. Splenomegaly    5. Diverticulosis    6. Bilateral inguinal hernia without obstruction or gangrene, recurrence not specified    7. Calculus of gallbladder without cholecystitis without obstruction        Blood pressure (!) 140/66, pulse 55, temperature 97.7 °F (36.5 °C), temperature source Oral, resp. rate 16, height 6' 1\" (1.854 m), weight 180 lb (81.6 kg), SpO2 98 %. DISPOSITION  Giuliana Berkowitz was discharged to home* in stable condition.         Ofe Greenwood DO  10/02/21 0666

## 2021-10-02 NOTE — ED NOTES
Pt told writer he was unable to breathe. Pt sat 99% on ra. Administered zofran and morphine per MD order. Pt now sleeping.        Hubert Rogers RN  10/02/21 7871

## 2021-10-02 NOTE — ED NOTES
9961 - PS to Dr Marcos Hurtado at [address] [facility]  Re: intractable nausea/portal hypertension  7865 - Dr Marcos Hurtado called back to speak with Dr Johan Escoto  10/02/21 2361

## 2021-10-02 NOTE — ED NOTES
Pt refusing to try and eat anything at this time, pt sts \"im too nauseous, I cant, I did keep water down. \" Will let provider know        Collin Stokes, RN  10/02/21 3823

## 2021-10-02 NOTE — ED NOTES
Bed: 10  Expected date:   Expected time:   Means of arrival:   Comments:  leonard Garcia RN  10/02/21 8110

## 2021-10-02 NOTE — ED PROVIDER NOTES
Emergency Physician Note        Note Open Time: 5:52 AM EDT    Chief Complaint  Nausea (vomiting since 2100)       History of Present Illness  Meng Moody is a 57 y.o. male who presents to the ED for vomiting. Patient reports he had vomiting or diarrhea throughout the night starting last night around 9 PM.  He is extremely anxious which limits the history. He states that talking makes him more nauseated and wants to vomit. He states this feels different than his prior bowel obstructions. He has history also of Crohn's disease apparently. He denies any chest pain or shortness of breath. No black or bloody emesis or black or bloody stools. No fevers. 10 systems reviewed, pertinent positives per HPI otherwise noted to be negative    I have reviewed the following from the nursing documentation:      Prior to Admission medications    Medication Sig Start Date End Date Taking?  Authorizing Provider   sertraline (ZOLOFT) 100 MG tablet Take 200 mg by mouth every morning    Yes Historical Provider, MD   buPROPion (WELLBUTRIN XL) 300 MG extended release tablet Take 300 mg by mouth every morning    Yes Historical Provider, MD       Allergies as of 10/02/2021    (No Known Allergies)       Past Medical History:   Diagnosis Date    Arthritis     Back    Depression     Iritis     Nasal polyp     PTSD (post-traumatic stress disorder)     SBO (small bowel obstruction) ([facility] [address]         Surgical History:   Past Surgical History:   Procedure Laterality Date    APPENDECTOMY      CARPAL TUNNEL RELEASE      bilateral     COLONOSCOPY  06/20/2018    TI inflamation        Family History:    Family History   Problem Relation Age of Onset    Heart Failure Father     Hypertension Father     Asthma Neg Hx     Cancer Neg Hx     Diabetes Neg Hx     Emphysema Neg Hx        Social History     Socioeconomic History    Marital status:      Spouse name: Not on file    Number of children: Not on file    Years of education: Not on file    Highest education level: Not on file   Occupational History    Occupation: deputy seo     Comment: French   Tobacco Use    Smoking status: Never Smoker    Smokeless tobacco: Never Used   Vaping Use    Vaping Use: Never used   Substance and Sexual Activity    Alcohol use: Yes     Comment: \"a little\" 7/2/21    Drug use: Yes     Frequency: 7.0 times per week     Types: Marijuana     Comment: every day    Sexual activity: Not on file   Other Topics Concern    Not on file   Social History Narrative    Not on file     Social Determinants of Health     Financial Resource Strain:     Difficulty of Paying Living Expenses:    Food Insecurity:     Worried About Running Out of Food in the Last Year:     [address] in the Last Year:    Transportation Needs:     Lack of Transportation (Medical):  Lack of Transportation (Non-Medical):    Physical Activity:     Days of Exercise per Week:     Minutes of Exercise per Session:    Stress:     Feeling of Stress :    Social Connections:     Frequency of Communication with Friends and Family:     Frequency of Social Gatherings with Friends and Family:     Attends [employer] Services:     Active Member of Clubs or Organizations:     Attends Club or Organization Meetings:     Marital Status:    Intimate Partner Violence:     Fear of Current or Ex-Partner:     Emotionally Abused:     Physically Abused:     Sexually Abused:        Nursing notes reviewed. ED Triage Vitals [10/02/21 0446]   Enc Vitals Group      BP (!) 157/71      Pulse 61      Resp 16      Temp       Temp Source Axillary      SpO2 100 %      Weight 180 lb (81.6 kg)      Height 6' 1\" (1.854 m)      Head Circumference       Peak Flow       Pain Score       Pain Loc       Pain Edu? Excl. in [address]? GENERAL:  Awake, alert. Well developed, well nourished with no apparent distress.    HENT:  Normocephalic, Atraumatic, moist mucous membranes. EYES:  Pupils equal round and reactive to light, Conjunctiva normal, extraocular movements normal.  NECK:  No meningeal signs, Supple. CHEST:  Regular rate and rhythm, chest wall non-tender. LUNGS:  Clear to auscultation bilaterally. ABDOMEN:  Soft, mild left upper quadrant tenderness, no rebound, rigidity or guarding, non-distended, normal bowel sounds. No costovertebral angle tenderness to palpation. BACK:  No tenderness. EXTREMITIES:  Normal range of motion, no edema, no bony tenderness, no deformity, distal pulses present. SKIN: Warm, dry and intact. NEUROLOGIC: Normal mental status. Moving all extremities to command. LABS and DIAGNOSTIC RESULTS  RADIOLOGY  X-RAYS:  I have reviewed radiologic plain film image(s). ALL OTHER NON-PLAIN FILM IMAGES SUCH AS CT, ULTRASOUND AND MRI HAVE BEEN READ BY THE RADIOLOGIST. CT ABDOMEN PELVIS W IV CONTRAST Additional Contrast? None    (Results Pending)        LABS  Labs Reviewed   CBC WITH AUTO DIFFERENTIAL   COMPREHENSIVE METABOLIC PANEL W/ REFLEX TO MG FOR LOW K   LACTIC ACID, PLASMA   LIPASE       MEDICAL DECISION MAKING          5:54 AM: I discussed the history, physical, and treatment plan with Dr. Marta Villanueva. Shannan Dodd was signed out in stable condition. Please see Dr. Leta Rutherford note for further details, including diagnosis and disposition. This chart was generated using the [address] [place], [place] [address]Th  dictation system. I created this record but it may contain dictation errors.          Donta Mcintosh MD  10/02/21 0775

## 2024-11-08 NOTE — ED NOTES
Bed: 11  Expected date:   Expected time:   Means of arrival:   Comments:  tomeka Bar RN  07/02/21 1919 independent